# Patient Record
Sex: FEMALE | Race: WHITE | NOT HISPANIC OR LATINO | URBAN - METROPOLITAN AREA
[De-identification: names, ages, dates, MRNs, and addresses within clinical notes are randomized per-mention and may not be internally consistent; named-entity substitution may affect disease eponyms.]

---

## 2018-03-25 ENCOUNTER — EMERGENCY (EMERGENCY)
Facility: HOSPITAL | Age: 73
LOS: 1 days | Discharge: HOME | End: 2018-03-25
Attending: EMERGENCY MEDICINE | Admitting: EMERGENCY MEDICINE
Payer: COMMERCIAL

## 2018-03-25 VITALS
RESPIRATION RATE: 18 BRPM | SYSTOLIC BLOOD PRESSURE: 126 MMHG | HEART RATE: 72 BPM | DIASTOLIC BLOOD PRESSURE: 72 MMHG | OXYGEN SATURATION: 97 %

## 2018-03-25 VITALS
DIASTOLIC BLOOD PRESSURE: 76 MMHG | RESPIRATION RATE: 18 BRPM | HEART RATE: 74 BPM | TEMPERATURE: 98 F | SYSTOLIC BLOOD PRESSURE: 195 MMHG | OXYGEN SATURATION: 96 %

## 2018-03-25 PROCEDURE — 99284 EMERGENCY DEPT VISIT MOD MDM: CPT | Mod: 25

## 2018-03-25 PROCEDURE — 96372 THER/PROPH/DIAG INJ SC/IM: CPT

## 2018-03-25 RX ORDER — DIAZEPAM 5 MG
1 TABLET ORAL
Qty: 15 | Refills: 0 | OUTPATIENT
Start: 2018-03-25 | End: 2018-03-29

## 2018-03-25 RX ORDER — KETOROLAC TROMETHAMINE 30 MG/ML
15 SYRINGE (ML) INJECTION ONCE
Qty: 0 | Refills: 0 | Status: DISCONTINUED | OUTPATIENT
Start: 2018-03-25 | End: 2018-03-25

## 2018-03-25 RX ORDER — OXYCODONE AND ACETAMINOPHEN 5; 325 MG/1; MG/1
1 TABLET ORAL ONCE
Qty: 0 | Refills: 0 | Status: DISCONTINUED | OUTPATIENT
Start: 2018-03-25 | End: 2018-03-25

## 2018-03-25 RX ORDER — KETOROLAC TROMETHAMINE 30 MG/ML
1 SYRINGE (ML) INJECTION
Qty: 20 | Refills: 0 | OUTPATIENT
Start: 2018-03-25 | End: 2018-03-29

## 2018-03-25 RX ORDER — MORPHINE SULFATE 50 MG/1
2 CAPSULE, EXTENDED RELEASE ORAL ONCE
Qty: 0 | Refills: 0 | Status: DISCONTINUED | OUTPATIENT
Start: 2018-03-25 | End: 2018-03-25

## 2018-03-25 RX ADMIN — MORPHINE SULFATE 2 MILLIGRAM(S): 50 CAPSULE, EXTENDED RELEASE ORAL at 02:08

## 2018-03-25 RX ADMIN — OXYCODONE AND ACETAMINOPHEN 1 TABLET(S): 5; 325 TABLET ORAL at 02:08

## 2018-03-25 RX ADMIN — OXYCODONE AND ACETAMINOPHEN 1 TABLET(S): 5; 325 TABLET ORAL at 01:08

## 2018-03-25 RX ADMIN — Medication 15 MILLIGRAM(S): at 02:03

## 2018-03-25 NOTE — ED PROVIDER NOTE - OBJECTIVE STATEMENT
73yo female with h/o ovarian ca currently receiving chemo (1st dose last week), no radiation presenting with muscle spasm of neck. Pt states that she woke up today and could not move her neck and now cannot sleep secondary to the pain. Pt denies fever, chills, injury.

## 2018-03-25 NOTE — ED ADULT NURSE NOTE - CHPI ED SYMPTOMS NEG
no blurred vision/no confusion/no change in level of consciousness/no loss of consciousness/no nausea/no vomiting/no dizziness/no fever/no weakness/no numbness

## 2018-03-25 NOTE — ED ADULT NURSE REASSESSMENT NOTE - NS ED NURSE REASSESS COMMENT FT1
Pt verbalized understanding of discharge instructions. Pt stated, " I am feeling much better" ambulating with steady gait noted.

## 2018-03-25 NOTE — ED ADULT NURSE NOTE - OBJECTIVE STATEMENT
pt presents to ED c/o neck pain. Pt states, " I went to bed and then I woke up with severe neck pain that goes to my head and ears and it feels like it is throbbing" pt denies trauma and fall.

## 2018-03-25 NOTE — ED PROVIDER NOTE - MEDICAL DECISION MAKING DETAILS
pt with severe spasm of parapsinal muscles and trapezius with point tenderness. No fever or risk of infection given that chemo was started recently. will reassess after valium and percocet.

## 2018-03-25 NOTE — ED ADULT NURSE REASSESSMENT NOTE - NS ED NURSE REASSESS COMMENT FT1
Pt states " I am still in a lot of pain in the same areas I mentioned before" ERP made aware. awaiting further orders

## 2018-03-29 DIAGNOSIS — Z88.0 ALLERGY STATUS TO PENICILLIN: ICD-10-CM

## 2018-03-29 DIAGNOSIS — Z88.1 ALLERGY STATUS TO OTHER ANTIBIOTIC AGENTS STATUS: ICD-10-CM

## 2018-03-29 DIAGNOSIS — M62.838 OTHER MUSCLE SPASM: ICD-10-CM

## 2018-03-29 DIAGNOSIS — M54.2 CERVICALGIA: ICD-10-CM

## 2020-03-23 NOTE — ED ADULT TRIAGE NOTE - SOURCE OF INFORMATION
Pt called us last week. Pt is still c/o having sinus congestion, pressure, and headache, her chest feels heavy when she takes a deep breath, she feels as though she has a low-grade fever-but she does not have a thermometer to check for sure-the stores are out of them, her ears feeling clogged, and fatigue. No SOB. Pt has not had any travel, and has not been exposed to anyone with the Covid-19 virus. Symptoms started last week on Thursday. Yesterday she started feeling dizzy, and she is still has it today. The dizziness is slight, but she can feel it. Please advise.    Patient

## 2020-12-11 ENCOUNTER — APPOINTMENT (OUTPATIENT)
Dept: ULTRASOUND IMAGING | Facility: CLINIC | Age: 75
End: 2020-12-11
Payer: COMMERCIAL

## 2020-12-11 ENCOUNTER — APPOINTMENT (OUTPATIENT)
Dept: MAMMOGRAPHY | Facility: CLINIC | Age: 75
End: 2020-12-11
Payer: COMMERCIAL

## 2020-12-11 ENCOUNTER — TRANSCRIPTION ENCOUNTER (OUTPATIENT)
Age: 75
End: 2020-12-11

## 2020-12-11 PROBLEM — Z00.00 ENCOUNTER FOR PREVENTIVE HEALTH EXAMINATION: Status: ACTIVE | Noted: 2020-12-11

## 2020-12-11 PROCEDURE — 77066 DX MAMMO INCL CAD BI: CPT

## 2020-12-11 PROCEDURE — G0279: CPT

## 2020-12-11 PROCEDURE — 76641 ULTRASOUND BREAST COMPLETE: CPT | Mod: RT

## 2022-03-22 NOTE — ED ADULT NURSE NOTE - EENT WDL
Clinic Care Coordination Contact  [facility]/Voicemail    Referral Source: IP Report  Clinical Data: Care Coordinator Outreach  Outreach attempted x 2.  Left message on patient's voicemail with call back information and requested return call.    Plan: Care Coordinator will send care coordination introduction letter with care coordinator contact information and explanation of care coordination services via mail. Care Coordinator will do no further outreaches at this time.     Araceli Freire RN, BSN, PHN  Primary Care / Care Coordinator   [facility]  E-mail [email or web address]   [phone]         Eyes with no visual disturbances.  Ears clean and dry and no hearing difficulties. Nose with pink mucosa and no drainage.  Mouth mucous membranes moist and pink.  No tenderness or swelling to throat or neck.

## 2022-06-08 ENCOUNTER — APPOINTMENT (OUTPATIENT)
Dept: MAMMOGRAPHY | Facility: CLINIC | Age: 77
End: 2022-06-08

## 2022-06-08 ENCOUNTER — APPOINTMENT (OUTPATIENT)
Dept: ULTRASOUND IMAGING | Facility: CLINIC | Age: 77
End: 2022-06-08

## 2022-08-03 ENCOUNTER — APPOINTMENT (OUTPATIENT)
Dept: ULTRASOUND IMAGING | Facility: CLINIC | Age: 77
End: 2022-08-03

## 2022-08-03 ENCOUNTER — APPOINTMENT (OUTPATIENT)
Dept: MAMMOGRAPHY | Facility: CLINIC | Age: 77
End: 2022-08-03

## 2022-08-03 PROCEDURE — 77063 BREAST TOMOSYNTHESIS BI: CPT

## 2022-08-03 PROCEDURE — 76641 ULTRASOUND BREAST COMPLETE: CPT | Mod: LT

## 2022-08-03 PROCEDURE — 77067 SCR MAMMO BI INCL CAD: CPT

## 2023-01-11 ENCOUNTER — NON-APPOINTMENT (OUTPATIENT)
Age: 78
End: 2023-01-11

## 2023-05-05 ENCOUNTER — NON-APPOINTMENT (OUTPATIENT)
Age: 78
End: 2023-05-05

## 2023-05-18 ENCOUNTER — NON-APPOINTMENT (OUTPATIENT)
Age: 78
End: 2023-05-18

## 2023-09-07 NOTE — ED ADULT NURSE NOTE - CADM POA URETHRAL CATHETER
This was a shared visit with the BRIELLE. I reviewed and verified the documentation and independently performed the documented: No

## 2023-11-20 ENCOUNTER — APPOINTMENT (OUTPATIENT)
Dept: ULTRASOUND IMAGING | Facility: CLINIC | Age: 78
End: 2023-11-20

## 2023-11-20 ENCOUNTER — APPOINTMENT (OUTPATIENT)
Dept: MAMMOGRAPHY | Facility: CLINIC | Age: 78
End: 2023-11-20
Payer: COMMERCIAL

## 2023-11-20 PROCEDURE — 76641 ULTRASOUND BREAST COMPLETE: CPT | Mod: LT

## 2023-11-20 PROCEDURE — 77063 BREAST TOMOSYNTHESIS BI: CPT

## 2023-11-20 PROCEDURE — 77067 SCR MAMMO BI INCL CAD: CPT

## 2023-11-22 ENCOUNTER — APPOINTMENT (OUTPATIENT)
Dept: ULTRASOUND IMAGING | Facility: CLINIC | Age: 78
End: 2023-11-22
Payer: COMMERCIAL

## 2023-11-22 PROCEDURE — 76642 ULTRASOUND BREAST LIMITED: CPT | Mod: LT

## 2023-11-30 ENCOUNTER — OUTPATIENT (OUTPATIENT)
Dept: OUTPATIENT SERVICES | Facility: HOSPITAL | Age: 78
LOS: 1 days | End: 2023-11-30

## 2023-11-30 ENCOUNTER — APPOINTMENT (OUTPATIENT)
Dept: ULTRASOUND IMAGING | Facility: CLINIC | Age: 78
End: 2023-11-30
Payer: COMMERCIAL

## 2023-11-30 PROCEDURE — 88305 TISSUE EXAM BY PATHOLOGIST: CPT | Mod: 26

## 2023-11-30 PROCEDURE — 19084 BX BREAST ADD LESION US IMAG: CPT | Mod: RT

## 2023-11-30 PROCEDURE — 19083 BX BREAST 1ST LESION US IMAG: CPT | Mod: LT

## 2023-11-30 PROCEDURE — 77066 DX MAMMO INCL CAD BI: CPT | Mod: 26

## 2024-02-08 ENCOUNTER — APPOINTMENT (OUTPATIENT)
Dept: CARDIOLOGY | Facility: CLINIC | Age: 79
End: 2024-02-08
Payer: COMMERCIAL

## 2024-02-08 VITALS
WEIGHT: 119 LBS | BODY MASS INDEX: 19.13 KG/M2 | OXYGEN SATURATION: 97 % | DIASTOLIC BLOOD PRESSURE: 78 MMHG | HEART RATE: 90 BPM | HEIGHT: 66 IN | SYSTOLIC BLOOD PRESSURE: 124 MMHG

## 2024-02-08 DIAGNOSIS — R03.0 ELEVATED BLOOD-PRESSURE READING, W/OUT DIAGNOSIS OF HYPERTENSION: ICD-10-CM

## 2024-02-08 DIAGNOSIS — R94.31 ABNORMAL ELECTROCARDIOGRAM [ECG] [EKG]: ICD-10-CM

## 2024-02-08 DIAGNOSIS — I65.29 OCCLUSION AND STENOSIS OF UNSPECIFIED CAROTID ARTERY: ICD-10-CM

## 2024-02-08 PROCEDURE — 93000 ELECTROCARDIOGRAM COMPLETE: CPT

## 2024-02-08 PROCEDURE — 99205 OFFICE O/P NEW HI 60 MIN: CPT

## 2024-02-08 RX ORDER — ELECTROLYTES/DEXTROSE
SOLUTION, ORAL ORAL
Refills: 0 | Status: ACTIVE | COMMUNITY

## 2024-02-08 RX ORDER — VITAMIN K2 90 MCG
1000 CAPSULE ORAL
Refills: 0 | Status: ACTIVE | COMMUNITY

## 2024-02-08 RX ORDER — ERGOCALCIFEROL 1.25 MG/1
CAPSULE ORAL
Refills: 0 | Status: ACTIVE | COMMUNITY

## 2024-02-08 RX ORDER — IPRATROPIUM BROMIDE 21 UG/1
0.03 SPRAY NASAL
Refills: 0 | Status: ACTIVE | COMMUNITY

## 2024-02-09 LAB
ALBUMIN SERPL ELPH-MCNC: 4.9 G/DL
ALP BLD-CCNC: 84 U/L
ALT SERPL-CCNC: 15 U/L
ANION GAP SERPL CALC-SCNC: 18 MMOL/L
AST SERPL-CCNC: 27 U/L
BILIRUB SERPL-MCNC: 0.8 MG/DL
BUN SERPL-MCNC: 31 MG/DL
CALCIUM SERPL-MCNC: 9.7 MG/DL
CHLORIDE SERPL-SCNC: 103 MMOL/L
CHOLEST SERPL-MCNC: 164 MG/DL
CO2 SERPL-SCNC: 19 MMOL/L
CREAT SERPL-MCNC: 0.84 MG/DL
EGFR: 72 ML/MIN/1.73M2
ESTIMATED AVERAGE GLUCOSE: 94 MG/DL
GLUCOSE SERPL-MCNC: 104 MG/DL
HBA1C MFR BLD HPLC: 4.9 %
HDLC SERPL-MCNC: 64 MG/DL
LDLC SERPL CALC-MCNC: 74 MG/DL
NONHDLC SERPL-MCNC: 100 MG/DL
NT-PROBNP SERPL-MCNC: 710 PG/ML
POTASSIUM SERPL-SCNC: 4.3 MMOL/L
PROT SERPL-MCNC: 6.6 G/DL
SODIUM SERPL-SCNC: 139 MMOL/L
TRIGL SERPL-MCNC: 149 MG/DL

## 2024-02-14 ENCOUNTER — APPOINTMENT (OUTPATIENT)
Dept: CARDIOLOGY | Facility: CLINIC | Age: 79
End: 2024-02-14
Payer: COMMERCIAL

## 2024-02-14 VITALS
HEIGHT: 66 IN | WEIGHT: 119 LBS | BODY MASS INDEX: 19.13 KG/M2 | DIASTOLIC BLOOD PRESSURE: 83 MMHG | SYSTOLIC BLOOD PRESSURE: 155 MMHG | OXYGEN SATURATION: 97 % | HEART RATE: 82 BPM

## 2024-02-14 DIAGNOSIS — I87.2 VENOUS INSUFFICIENCY (CHRONIC) (PERIPHERAL): ICD-10-CM

## 2024-02-14 PROCEDURE — 99205 OFFICE O/P NEW HI 60 MIN: CPT

## 2024-02-14 NOTE — PHYSICAL EXAM
[Normal Conjunctiva] : the conjunctiva exhibited no abnormalities [Normal Oral Mucosa] : normal oral mucosa [Heart Rate And Rhythm] : heart rate and rhythm were normal [Heart Sounds] : normal S1 and S2 [Arterial Pulses Normal] : the arterial pulses were normal [Edema] : no peripheral edema present [Abdomen Soft] : soft [Abdomen Tenderness] : non-tender [Abnormal Walk] : normal gait [] : no ischemic changes [FreeTextEntry1] : reticular pattern bilateral lower extremities  [No Venous Stasis] : no venous stasis [No Skin Ulcers] : no skin ulcer [Oriented To Time, Place, And Person] : oriented to person, place, and time [Impaired Insight] : insight and judgment were intact [Affect] : the affect was normal

## 2024-02-14 NOTE — REVIEW OF SYSTEMS
[Palpitations] : palpitations [Joint Pain] : joint pain [Joint Swelling] : joint swelling [Joint Stiffness] : joint stiffness [Change In Color Of Skin] : change in skin color [Numbness (Hypoesthesia)] : numbness [Easy Bruising] : a tendency for easy bruising [Negative] : Heme/Lymph [de-identified] : in cold weather get reticular pattern on legs

## 2024-02-14 NOTE — REASON FOR VISIT
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 Referred for vascular medicine evaluation.   Prior venous testing before COVID. No prior procedures; attempted sclerotherapy on mid calf spider veins a long time ago that was really painful. No prior DVT. Her legs are not swollen. She mostly complains of some tenderness over areas of spider veins or capillaries. She wears compression stockings. She swims everyday.  Varicose veins are most prominent behind her knees.   Working on her BP; related to avastin. Seeing nephrology tomorrow. Has carotid stenosis and getting a repeat carotid arterial duplex.

## 2024-02-14 NOTE — ASSESSMENT
[FreeTextEntry1] : #Varicose veins: no recent resting for venous insufficiency. No prior DVT. Bothersome varicose veins. Wears compression stockings. Prior sclerotherapy for spider veins.   -venous duplex with reflux studies -continue conservative therapy with compression and elevation  -continue to exercise with swimming -HTN management per nephrology and her cardiology oncologist -will call her with the results of the venous duplex and make a follow-up based on findings and if it changes management plan

## 2024-02-15 ENCOUNTER — NON-APPOINTMENT (OUTPATIENT)
Age: 79
End: 2024-02-15

## 2024-02-15 PROBLEM — R03.0 ELEVATED BLOOD PRESSURE READING: Status: ACTIVE | Noted: 2024-02-15

## 2024-02-15 RX ORDER — PRASTERONE (DHEA) 50 MG
CAPSULE ORAL
Refills: 0 | Status: ACTIVE | COMMUNITY

## 2024-02-15 NOTE — HISTORY OF PRESENT ILLNESS
[FreeTextEntry1] : Jeanine was diagnosed with stage IIIC fallopian tube carcinoma in . In 2018 there was evidence of recurrence. She has received several lines of therapy as noted above and is currently on maintenance bevacizumab with no evidence of disease on imaging scans but slightly increasing CA-125.  Over the past 6 months, noted to have proteinuria and slightly increased blood pressure. She is asymptomatic. She has not noticed any corresponding changes, but the most recent treatment cycle was held pending evaluation by a Nephrologist. She notes only minimally abnormal CA-125 levels throughout her cancer treatment. A PET scan was ordered by her oncologist given the discordance between tumor markers and imaging, but is pending insurance approval.  She has followed with cardiologist for hypercholesterolemia, aortic stenosis, and carotid atherosclerotic plaque. She reports a history of intermittent palpitations, which feel like a fullness or skipped beat, which she had for many years -- prior to cancer treatment. These do not bother her much and are not recently changed.  She remains active, swimming 30-60 minutes a few times per week and walking. She denies chest pain, shortness of breath, or dyspnea on exertion.  She is a former smoker. She lives in [place] and [facility] with her . 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  Cardiovascular Summary: ---------------------------------------------- EC2024: sinus rhythm with LVH and repolarization abnormalities ---------------------------------------------- Echo: ---------------------------------------------- Stress: ---------------------------------------------- CT/MRI ---------------------------------------------- Remote/ambulatory rhythm monitoring:

## 2024-02-15 NOTE — REASON FOR VISIT
[FreeTextEntry3] : Dr. Barrie Youssef/Dr. Zonia Sandoval [FreeTextEntry1] : ------------------------------------------------------------------------ ANTOINE KAPLAN is a 78 year old woman with a history of hypercholesterolemia and recurrent fallopian tube carcinoma seen for hypertension and valvular heart disease.  Prior Cancer Treatments: ------------------------------------------------------------------------ Chemo/targeted therapy: 10/30/2013-4/3/2014: carboplatin/Taxol x8 8/29/2014-1/14/2015: cyclophosphamide 3/22/2018-10/15/2018: carboplatin/gemcitabine 1/24/2018-4/1/2019: Doxil x 4 5/23/2019-1/2020: Taxol/carboplatin --> Taxol maintenance 2/27/2020-12/1/2021: olaparib  1/27/2022-6/22/2022: Taxol + bevacizumab 9/28/2022: bevacizumab 15 mg/kg q3 weeks (maintenance) ------------------------------------------------------------------------ Surgery: 10/7/2013: ex-lap, DHIRAJ-BSO 6/2/2014: ex-lap ------------------------------------------------------------------------ Radiation: 6/2014: vaginal radiation

## 2024-02-15 NOTE — REVIEW OF SYSTEMS
[Palpitations] : palpitations [Joint Pain] : joint pain [Joint Stiffness] : joint stiffness [Numbness (Hypoesthesia)] : numbness [Negative] : Heme/Lymph [SOB] : no shortness of breath [Syncope] : no syncope

## 2024-02-15 NOTE — ASSESSMENT
[FreeTextEntry1] : ---------------------------- # Proteinuria and elevated blood pressure - likely related to VEGF inhibitor. More recently increased proteinuria and BP more elevated at times. No edema. Normal renal function. Also increased fasting plasma glucose readings. Proteinuria is increasing but not nephrotic range and < 2 gram/day on 24 hour assessments. - would start ARB (e.g. losartan)  - check A1c - to see Nephrologist at [facility] on 2/15 (Dr. Nirav Argueta) - and will f/u after to discuss  At present, the proteinuria and hypertension should not preclude further bevacizumab if she is responding, would continue with monitoring of renal function and proteinuria. 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 A PET scan was recommended by Dr. Sandoval, but not yet approved by her insurance. Given discordance and potential VEGF toxicity, I agree a PET scan would be helpful. - Will check pro-BNP today to see if fluid retention from cardiomyopathy could contribute to CA-125 increase  # Aortic stenosis - moderate on examination - to check echocardiogram - will work with Jeanine to obtain prior cardiology records  # Atherosclerosis and carotid stenosis: asymptomatic at present - continue rosuvastatin - check lipid panel - carotid duplex ordered - continue aspirin 81 mg daily for now  # Varicose Veins: - referred to vascular medicine for eval   Follow up with me in 2 months or after upcoming Medical Oncology follow up
Basal cell carcinoma  and squamous cell carcinoma - removed  Cataract  bilateral  DDD (degenerative disc disease), lumbar    Goiter    Hearing loss  left - mild  Hypertension    Lumbar stenosis    Obesity    Psoriatic arthritis    Renal cell carcinoma  with mets.  - S/P R nephrectomy in 2007 and, splenectomy and pancreatic resection 6/2019  RSD (reflex sympathetic dystrophy)    Thyroid nodule

## 2024-02-15 NOTE — PHYSICAL EXAM
[Normal] : well developed, well nourished, no acute distress [Normal Conjunctiva] : normal conjunctiva [No Xanthelasma] : no xanthelasma [Normal Venous Pressure] : normal venous pressure [No Carotid Bruit] : no carotid bruit [Normal S1, S2] : normal S1, S2 [No Rub] : no rub [No Gallop] : no gallop [Murmur] : murmur [Clear Lung Fields] : clear lung fields [Good Air Entry] : good air entry [No Respiratory Distress] : no respiratory distress  [Soft] : abdomen soft [Non Tender] : non-tender [Normal Gait] : normal gait [Gait - Sufficient for Exercise Testing] : gait - sufficient for exercise testing [No Edema] : no edema [No Cyanosis] : no cyanosis [No Clubbing] : no clubbing [No Varicosities] : no varicosities [Normal DP B/L] : normal DP B/L [No Rash] : no rash [Moves all extremities] : moves all extremities [No Focal Deficits] : no focal deficits [Normal Speech] : normal speech [Alert and Oriented] : alert and oriented [Normal memory] : normal memory [de-identified] : systolic ejection murmur

## 2024-02-20 ENCOUNTER — APPOINTMENT (OUTPATIENT)
Dept: HEART AND VASCULAR | Facility: CLINIC | Age: 79
End: 2024-02-20
Payer: COMMERCIAL

## 2024-02-20 PROCEDURE — 93306 TTE W/DOPPLER COMPLETE: CPT

## 2024-02-21 RX ORDER — LOSARTAN POTASSIUM 25 MG/1
25 TABLET, FILM COATED ORAL
Qty: 30 | Refills: 3 | Status: ACTIVE | COMMUNITY
Start: 2024-02-21 | End: 1900-01-01

## 2024-03-03 ENCOUNTER — NON-APPOINTMENT (OUTPATIENT)
Age: 79
End: 2024-03-03

## 2024-03-07 ENCOUNTER — APPOINTMENT (OUTPATIENT)
Dept: HEART AND VASCULAR | Facility: CLINIC | Age: 79
End: 2024-03-07
Payer: COMMERCIAL

## 2024-03-07 PROCEDURE — 93880 EXTRACRANIAL BILAT STUDY: CPT

## 2024-03-19 ENCOUNTER — NON-APPOINTMENT (OUTPATIENT)
Age: 79
End: 2024-03-19

## 2024-03-21 RX ORDER — ROSUVASTATIN CALCIUM 20 MG/1
20 TABLET, FILM COATED ORAL
Qty: 90 | Refills: 3 | Status: ACTIVE | COMMUNITY
Start: 1900-01-01 | End: 1900-01-01

## 2024-03-22 ENCOUNTER — APPOINTMENT (OUTPATIENT)
Dept: HEART AND VASCULAR | Facility: CLINIC | Age: 79
End: 2024-03-22
Payer: COMMERCIAL

## 2024-03-22 PROCEDURE — 93970 EXTREMITY STUDY: CPT

## 2024-03-27 ENCOUNTER — NON-APPOINTMENT (OUTPATIENT)
Age: 79
End: 2024-03-27

## 2024-04-25 ENCOUNTER — APPOINTMENT (OUTPATIENT)
Dept: CARDIOLOGY | Facility: CLINIC | Age: 79
End: 2024-04-25
Payer: COMMERCIAL

## 2024-04-25 ENCOUNTER — NON-APPOINTMENT (OUTPATIENT)
Age: 79
End: 2024-04-25

## 2024-04-25 VITALS
BODY MASS INDEX: 18.96 KG/M2 | WEIGHT: 118 LBS | HEART RATE: 96 BPM | HEIGHT: 66 IN | OXYGEN SATURATION: 99 % | SYSTOLIC BLOOD PRESSURE: 132 MMHG | TEMPERATURE: 97.5 F | DIASTOLIC BLOOD PRESSURE: 79 MMHG

## 2024-04-25 DIAGNOSIS — I35.0 NONRHEUMATIC AORTIC (VALVE) STENOSIS: ICD-10-CM

## 2024-04-25 DIAGNOSIS — R80.9 PROTEINURIA, UNSPECIFIED: ICD-10-CM

## 2024-04-25 DIAGNOSIS — C57.00 MALIGNANT NEOPLASM OF UNSPECIFIED FALLOPIAN TUBE: ICD-10-CM

## 2024-04-25 DIAGNOSIS — I10 ESSENTIAL (PRIMARY) HYPERTENSION: ICD-10-CM

## 2024-04-25 PROCEDURE — 93000 ELECTROCARDIOGRAM COMPLETE: CPT

## 2024-04-25 PROCEDURE — G2211 COMPLEX E/M VISIT ADD ON: CPT | Mod: NC,1L

## 2024-04-25 PROCEDURE — 99215 OFFICE O/P EST HI 40 MIN: CPT

## 2024-04-25 RX ORDER — BEVACIZUMAB 400 MG/16ML
INJECTION, SOLUTION INTRAVENOUS
Refills: 0 | Status: COMPLETED | COMMUNITY
End: 2024-04-25

## 2024-04-25 NOTE — HISTORY OF PRESENT ILLNESS
[FreeTextEntry1] : Interval History: She has been taking losartan and the blood pressures at home have been mostly in the 130s systolic. She has not had recurrent assessment of proteinuria. After discussion with Dr. Sandoval and Dr. Youssef, she began treatment with Elahere (mirvetuximab) one week ago on . She is using topical eye protection. She reports no changes in vision. Otherwise, she was noted to have a tick embedded under the skin near her bra line. This was removed. She never noticed a rash or other associated symptoms of a tick bite. She is completing prophylactic doxycycline. She notes some increased fatigue, but still walking a mild to exercise at the gym each day and continues to swim a few times a week. She notes the most recent CA-125 was lower (70) - prior to starting the new agent.  History: Jeanine was diagnosed with stage IIIC fallopian tube carcinoma in . In 2018 there was evidence of recurrence. She has received several lines of therapy as noted above and is currently on maintenance bevacizumab with no evidence of disease on imaging scans but slightly increasing CA-125.  Over the past 6 months, noted to have proteinuria and slightly increased blood pressure. She is asymptomatic. She has not noticed any corresponding changes, but the most recent treatment cycle was held pending evaluation by a Nephrologist. She notes only minimally abnormal CA-125 levels throughout her cancer treatment. A PET scan was ordered by her oncologist given the discordance between tumor markers and imaging, but is pending insurance approval.  She has followed with cardiologist for hypercholesterolemia, aortic stenosis, and carotid atherosclerotic plaque. She reports a history of intermittent palpitations, which feel like a fullness or skipped beat, which she had for many years -- prior to cancer treatment. These do not bother her much and are not recently changed.  She remains active, swimming 30-60 minutes a few times per week and walking. She denies chest pain, shortness of breath, or dyspnea on exertion.  She is a former smoker. She lives in [place] and [facility] with her . 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  Cardiovascular Summary: ---------------------------------------------- EC2024: sinus rhythm with LVH and abnormal repol (no sig change) 2024: sinus rhythm with LVH and repolarization abnormalities ---------------------------------------------- Echo: 2024: EF 75 %, moderate AS (BERNADETTE 1.2), mean gradient 32,  ---------------------------------------------- Carotid: 3/7/2024: R pICA 20-50%, L pICA 20-50 % ----------------------------------------------

## 2024-04-25 NOTE — REVIEW OF SYSTEMS
[Palpitations] : palpitations [Joint Pain] : joint pain [Joint Stiffness] : joint stiffness [Numbness (Hypoesthesia)] : numbness [Negative] : Heme/Lymph [Feeling Fatigued] : feeling fatigued [Blurry Vision] : no blurred vision [Eye Pain] : no eye pain [SOB] : shortness of breath [Syncope] : no syncope

## 2024-04-25 NOTE — REASON FOR VISIT
[FreeTextEntry3] : Dr. Barrie Youssef/Dr. Zonia Sandoval [FreeTextEntry1] : ------------------------------------------------------------------------ ANTOINE KAPLAN is a 78 year old woman with a history of hypercholesterolemia and recurrent fallopian tube carcinoma seen for hypertension and valvular heart disease.  Prior Cancer Treatments: ------------------------------------------------------------------------ Chemo/targeted therapy: 10/30/2013-4/3/2014: carboplatin/Taxol x8 8/29/2014-1/14/2015: cyclophosphamide 3/22/2018-10/15/2018: carboplatin/gemcitabine 1/24/2018-4/1/2019: Doxil x 4 5/23/2019-1/2020: Taxol/carboplatin --> Taxol maintenance 2/27/2020-12/1/2021: olaparib  1/27/2022-6/22/2022: Taxol + bevacizumab 9/28/2022-January 2024: bevacizumab 15 mg/kg q3 weeks (maintenance) 4/17/2024: Elahere ------------------------------------------------------------------------ Surgery: 10/7/2013: DHIRAJ ayala-BSO 6/2/2014: jamie ------------------------------------------------------------------------ Radiation: 6/2014: vaginal radiation

## 2024-04-25 NOTE — ASSESSMENT
[FreeTextEntry1] : ---------------------------- # Proteinuria and elevated blood pressure - likely related to VEGF inhibitor. No edema. Normal renal function. Also increased fasting plasma glucose readings. Proteinuria is increasing but not nephrotic range and < 2 gram/day on 24 hour assessments. At present, the proteinuria and hypertension should not preclude further bevacizumab. However, for other reasons (suspected resistance), the treatment was changed to mirvetuximab. - to continue losartan for now - repeat urine protein at next opportunity - monitor BP, discussed that it may normalize off bevacizumab  # Aortic stenosis - moderate  # Atherosclerosis and carotid stenosis: asymptomatic at present - continue rosuvastatin - continue aspirin 81 mg daily for now  # Varicose Veins: with venous insufficiency - for follow up with Vascular  To continue follow up of response to Elahere. Fatigue may be related to tick bite, as it seems to have preceded this agent. In addition, Hgb was slightly lower than baseline.   Follow up with me in

## 2024-04-25 NOTE — PHYSICAL EXAM
[Normal] : well developed, well nourished, no acute distress [Normal Conjunctiva] : normal conjunctiva [No Xanthelasma] : no xanthelasma [Normal Venous Pressure] : normal venous pressure [No Carotid Bruit] : no carotid bruit [Normal S1, S2] : normal S1, S2 [No Rub] : no rub [No Gallop] : no gallop [Murmur] : murmur [Clear Lung Fields] : clear lung fields [Good Air Entry] : good air entry [No Respiratory Distress] : no respiratory distress  [Soft] : abdomen soft [Non Tender] : non-tender [Normal Gait] : normal gait [Gait - Sufficient for Exercise Testing] : gait - sufficient for exercise testing [No Edema] : no edema [No Cyanosis] : no cyanosis [No Clubbing] : no clubbing [No Varicosities] : no varicosities [Normal DP B/L] : normal DP B/L [No Rash] : no rash [Moves all extremities] : moves all extremities [No Focal Deficits] : no focal deficits [Normal Speech] : normal speech [Alert and Oriented] : alert and oriented [Normal memory] : normal memory [Venous varicosities] : venous varicosities [de-identified] : systolic ejection murmur

## 2024-04-25 NOTE — DISCUSSION/SUMMARY
Condition:: FBSE
Please Describe Your Condition:: is an established patient who is being seen for a chief complaint of FBSE. \\n- Annual FBSE, last seen 5/27/22\\n- H/O AKs and Dysplastic Nevi \\n- Complaints of irritated skin tags\\n- Bump on posterior scalp, No associated pain
[EKG obtained to assist in diagnosis and management of assessed problem(s)] : EKG obtained to assist in diagnosis and management of assessed problem(s)

## 2024-04-27 ENCOUNTER — NON-APPOINTMENT (OUTPATIENT)
Age: 79
End: 2024-04-27
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
## 2024-05-28 DIAGNOSIS — S30.861A INSECT BITE (NONVENOMOUS) OF ABDOMINAL WALL, INITIAL ENCOUNTER: ICD-10-CM

## 2024-05-31 ENCOUNTER — LABORATORY RESULT (OUTPATIENT)
Age: 79
End: 2024-05-31

## 2024-10-10 ENCOUNTER — NON-APPOINTMENT (OUTPATIENT)
Age: 79
End: 2024-10-10

## 2024-10-10 ENCOUNTER — APPOINTMENT (OUTPATIENT)
Dept: CARDIOLOGY | Facility: CLINIC | Age: 79
End: 2024-10-10
Payer: COMMERCIAL

## 2024-10-10 VITALS
SYSTOLIC BLOOD PRESSURE: 146 MMHG | BODY MASS INDEX: 18.16 KG/M2 | HEIGHT: 66 IN | WEIGHT: 113 LBS | HEART RATE: 84 BPM | TEMPERATURE: 97.3 F | OXYGEN SATURATION: 97 % | DIASTOLIC BLOOD PRESSURE: 67 MMHG

## 2024-10-10 DIAGNOSIS — R06.02 SHORTNESS OF BREATH: ICD-10-CM

## 2024-10-10 DIAGNOSIS — R80.9 PROTEINURIA, UNSPECIFIED: ICD-10-CM

## 2024-10-10 DIAGNOSIS — C57.00 MALIGNANT NEOPLASM OF UNSPECIFIED FALLOPIAN TUBE: ICD-10-CM

## 2024-10-10 DIAGNOSIS — S30.861A INSECT BITE (NONVENOMOUS) OF ABDOMINAL WALL, INITIAL ENCOUNTER: ICD-10-CM
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
## 2024-10-10 DIAGNOSIS — I65.29 OCCLUSION AND STENOSIS OF UNSPECIFIED CAROTID ARTERY: ICD-10-CM

## 2024-10-10 DIAGNOSIS — I10 ESSENTIAL (PRIMARY) HYPERTENSION: ICD-10-CM

## 2024-10-10 DIAGNOSIS — I35.0 NONRHEUMATIC AORTIC (VALVE) STENOSIS: ICD-10-CM

## 2024-10-10 PROCEDURE — G2211 COMPLEX E/M VISIT ADD ON: CPT | Mod: NC

## 2024-10-10 PROCEDURE — 99215 OFFICE O/P EST HI 40 MIN: CPT

## 2024-10-10 PROCEDURE — 93000 ELECTROCARDIOGRAM COMPLETE: CPT

## 2024-10-11 LAB
ALBUMIN SERPL ELPH-MCNC: 4.4 G/DL
ALP BLD-CCNC: 109 U/L
ALT SERPL-CCNC: 21 U/L
ANION GAP SERPL CALC-SCNC: 18 MMOL/L
AST SERPL-CCNC: 45 U/L
BILIRUB SERPL-MCNC: 0.7 MG/DL
BUN SERPL-MCNC: 14 MG/DL
CALCIUM SERPL-MCNC: 9.2 MG/DL
CHLORIDE SERPL-SCNC: 103 MMOL/L
CO2 SERPL-SCNC: 19 MMOL/L
CREAT SERPL-MCNC: 0.79 MG/DL
CREAT SPEC-SCNC: 110 MG/DL
CREAT/PROT UR: 0.2 RATIO
EGFR: 77 ML/MIN/1.73M2
GLUCOSE SERPL-MCNC: 83 MG/DL
NT-PROBNP SERPL-MCNC: 1518 PG/ML
POTASSIUM SERPL-SCNC: 4 MMOL/L
PROT SERPL-MCNC: 6.6 G/DL
PROT UR-MCNC: 25 MG/DL
PROT UR-MCNC: 25 MG/DL
SODIUM SERPL-SCNC: 141 MMOL/L

## 2024-11-21 ENCOUNTER — APPOINTMENT (OUTPATIENT)
Dept: MAMMOGRAPHY | Facility: CLINIC | Age: 79
End: 2024-11-21
Payer: COMMERCIAL

## 2024-11-21 ENCOUNTER — APPOINTMENT (OUTPATIENT)
Dept: ULTRASOUND IMAGING | Facility: CLINIC | Age: 79
End: 2024-11-21

## 2024-11-21 PROCEDURE — 77063 BREAST TOMOSYNTHESIS BI: CPT

## 2024-11-21 PROCEDURE — 76641 ULTRASOUND BREAST COMPLETE: CPT | Mod: 50

## 2024-11-21 PROCEDURE — 77067 SCR MAMMO BI INCL CAD: CPT

## 2024-12-17 ENCOUNTER — APPOINTMENT (OUTPATIENT)
Dept: HEART AND VASCULAR | Facility: CLINIC | Age: 79
End: 2024-12-17
Payer: COMMERCIAL

## 2024-12-17 PROCEDURE — 93351 STRESS TTE COMPLETE: CPT

## 2025-01-06 ENCOUNTER — RX RENEWAL (OUTPATIENT)
Age: 80
End: 2025-01-06

## 2025-02-20 ENCOUNTER — RESULT REVIEW (OUTPATIENT)
Age: 80
End: 2025-02-20

## 2025-02-20 ENCOUNTER — OUTPATIENT (OUTPATIENT)
Dept: OUTPATIENT SERVICES | Facility: HOSPITAL | Age: 80
LOS: 1 days | End: 2025-02-20
Payer: COMMERCIAL

## 2025-02-20 DIAGNOSIS — Z01.818 ENCOUNTER FOR OTHER PREPROCEDURAL EXAMINATION: ICD-10-CM

## 2025-02-20 DIAGNOSIS — I35.0 NONRHEUMATIC AORTIC (VALVE) STENOSIS: ICD-10-CM

## 2025-02-20 LAB
ALBUMIN SERPL ELPH-MCNC: 4.4 G/DL — SIGNIFICANT CHANGE UP (ref 3.3–5)
ALP SERPL-CCNC: 118 U/L — SIGNIFICANT CHANGE UP (ref 40–120)
ALT FLD-CCNC: 14 U/L — SIGNIFICANT CHANGE UP (ref 10–45)
ANION GAP SERPL CALC-SCNC: 11 MMOL/L — SIGNIFICANT CHANGE UP (ref 5–17)
APTT BLD: 32.4 SEC — SIGNIFICANT CHANGE UP (ref 24.5–35.6)
AST SERPL-CCNC: 29 U/L — SIGNIFICANT CHANGE UP (ref 10–40)
BASOPHILS # BLD AUTO: 0.05 K/UL — SIGNIFICANT CHANGE UP (ref 0–0.2)
BASOPHILS NFR BLD AUTO: 1.1 % — SIGNIFICANT CHANGE UP (ref 0–2)
BILIRUB SERPL-MCNC: 0.7 MG/DL — SIGNIFICANT CHANGE UP (ref 0.2–1.2)
BLD GP AB SCN SERPL QL: POSITIVE — SIGNIFICANT CHANGE UP
BLD GP AB SCN SERPL QL: POSITIVE — SIGNIFICANT CHANGE UP
BUN SERPL-MCNC: 18 MG/DL — SIGNIFICANT CHANGE UP (ref 7–23)
CALCIUM SERPL-MCNC: 9.6 MG/DL — SIGNIFICANT CHANGE UP (ref 8.4–10.5)
CHLORIDE SERPL-SCNC: 100 MMOL/L — SIGNIFICANT CHANGE UP (ref 96–108)
CO2 SERPL-SCNC: 27 MMOL/L — SIGNIFICANT CHANGE UP (ref 22–31)
CREAT SERPL-MCNC: 0.75 MG/DL — SIGNIFICANT CHANGE UP (ref 0.5–1.3)
EGFR: 81 ML/MIN/1.73M2 — SIGNIFICANT CHANGE UP
EOSINOPHIL # BLD AUTO: 0.18 K/UL — SIGNIFICANT CHANGE UP (ref 0–0.5)
EOSINOPHIL NFR BLD AUTO: 4 % — SIGNIFICANT CHANGE UP (ref 0–6)
GLUCOSE SERPL-MCNC: 90 MG/DL — SIGNIFICANT CHANGE UP (ref 70–99)
HCT VFR BLD CALC: 37.2 % — SIGNIFICANT CHANGE UP (ref 34.5–45)
HGB BLD-MCNC: 12.6 G/DL — SIGNIFICANT CHANGE UP (ref 11.5–15.5)
IMM GRANULOCYTES NFR BLD AUTO: 0.2 % — SIGNIFICANT CHANGE UP (ref 0–0.9)
INR BLD: 0.97 — SIGNIFICANT CHANGE UP (ref 0.85–1.16)
LYMPHOCYTES # BLD AUTO: 0.85 K/UL — LOW (ref 1–3.3)
LYMPHOCYTES # BLD AUTO: 18.7 % — SIGNIFICANT CHANGE UP (ref 13–44)
MCHC RBC-ENTMCNC: 32.2 PG — SIGNIFICANT CHANGE UP (ref 27–34)
MCHC RBC-ENTMCNC: 33.9 G/DL — SIGNIFICANT CHANGE UP (ref 32–36)
MCV RBC AUTO: 95.1 FL — SIGNIFICANT CHANGE UP (ref 80–100)
MONOCYTES # BLD AUTO: 0.49 K/UL — SIGNIFICANT CHANGE UP (ref 0–0.9)
MONOCYTES NFR BLD AUTO: 10.8 % — SIGNIFICANT CHANGE UP (ref 2–14)
NEUTROPHILS # BLD AUTO: 2.96 K/UL — SIGNIFICANT CHANGE UP (ref 1.8–7.4)
NEUTROPHILS NFR BLD AUTO: 65.2 % — SIGNIFICANT CHANGE UP (ref 43–77)
NRBC BLD AUTO-RTO: 0 /100 WBCS — SIGNIFICANT CHANGE UP (ref 0–0)
NT-PROBNP SERPL-SCNC: 797 PG/ML — HIGH (ref 0–300)
PLATELET # BLD AUTO: 177 K/UL — SIGNIFICANT CHANGE UP (ref 150–400)
POTASSIUM SERPL-MCNC: 4.2 MMOL/L — SIGNIFICANT CHANGE UP (ref 3.5–5.3)
POTASSIUM SERPL-SCNC: 4.2 MMOL/L — SIGNIFICANT CHANGE UP (ref 3.5–5.3)
PROT SERPL-MCNC: 7.1 G/DL — SIGNIFICANT CHANGE UP (ref 6–8.3)
PROTHROM AB SERPL-ACNC: 11.3 SEC — SIGNIFICANT CHANGE UP (ref 9.9–13.4)
RBC # BLD: 3.91 M/UL — SIGNIFICANT CHANGE UP (ref 3.8–5.2)
RBC # FLD: 13.2 % — SIGNIFICANT CHANGE UP (ref 10.3–14.5)
RH IG SCN BLD-IMP: POSITIVE — SIGNIFICANT CHANGE UP
RH IG SCN BLD-IMP: POSITIVE — SIGNIFICANT CHANGE UP
SODIUM SERPL-SCNC: 138 MMOL/L — SIGNIFICANT CHANGE UP (ref 135–145)
WBC # BLD: 4.54 K/UL — SIGNIFICANT CHANGE UP (ref 3.8–10.5)
WBC # FLD AUTO: 4.54 K/UL — SIGNIFICANT CHANGE UP (ref 3.8–10.5)

## 2025-02-20 PROCEDURE — 86900 BLOOD TYPING SEROLOGIC ABO: CPT

## 2025-02-20 PROCEDURE — 86850 RBC ANTIBODY SCREEN: CPT

## 2025-02-20 PROCEDURE — 85730 THROMBOPLASTIN TIME PARTIAL: CPT

## 2025-02-20 PROCEDURE — 86901 BLOOD TYPING SEROLOGIC RH(D): CPT

## 2025-02-20 PROCEDURE — 86870 RBC ANTIBODY IDENTIFICATION: CPT

## 2025-02-20 PROCEDURE — 80053 COMPREHEN METABOLIC PANEL: CPT

## 2025-02-20 PROCEDURE — 85025 COMPLETE CBC W/AUTO DIFF WBC: CPT

## 2025-02-20 PROCEDURE — 83880 ASSAY OF NATRIURETIC PEPTIDE: CPT

## 2025-02-20 PROCEDURE — 93306 TTE W/DOPPLER COMPLETE: CPT

## 2025-02-20 PROCEDURE — 85610 PROTHROMBIN TIME: CPT

## 2025-02-20 PROCEDURE — 93306 TTE W/DOPPLER COMPLETE: CPT | Mod: 26

## 2025-02-20 PROCEDURE — 86077 PHYS BLOOD BANK SERV XMATCH: CPT

## 2025-02-24 ENCOUNTER — NON-APPOINTMENT (OUTPATIENT)
Age: 80
End: 2025-02-24

## 2025-03-10 ENCOUNTER — NON-APPOINTMENT (OUTPATIENT)
Age: 80
End: 2025-03-10

## 2025-03-10 ENCOUNTER — APPOINTMENT (OUTPATIENT)
Dept: CARDIOTHORACIC SURGERY | Facility: CLINIC | Age: 80
End: 2025-03-10
Payer: COMMERCIAL

## 2025-03-10 ENCOUNTER — RESULT REVIEW (OUTPATIENT)
Age: 80
End: 2025-03-10

## 2025-03-10 ENCOUNTER — APPOINTMENT (OUTPATIENT)
Dept: ULTRASOUND IMAGING | Facility: HOSPITAL | Age: 80
End: 2025-03-10

## 2025-03-10 ENCOUNTER — OUTPATIENT (OUTPATIENT)
Dept: OUTPATIENT SERVICES | Facility: HOSPITAL | Age: 80
LOS: 1 days | End: 2025-03-10
Payer: COMMERCIAL

## 2025-03-10 ENCOUNTER — APPOINTMENT (OUTPATIENT)
Dept: CT IMAGING | Facility: HOSPITAL | Age: 80
End: 2025-03-10

## 2025-03-10 VITALS
DIASTOLIC BLOOD PRESSURE: 61 MMHG | OXYGEN SATURATION: 99 % | SYSTOLIC BLOOD PRESSURE: 122 MMHG | TEMPERATURE: 96.8 F | WEIGHT: 115 LBS | BODY MASS INDEX: 18.48 KG/M2 | HEIGHT: 66 IN | HEART RATE: 77 BPM

## 2025-03-10 DIAGNOSIS — Z87.891 PERSONAL HISTORY OF NICOTINE DEPENDENCE: ICD-10-CM

## 2025-03-10 DIAGNOSIS — G62.9 POLYNEUROPATHY, UNSPECIFIED: ICD-10-CM

## 2025-03-10 DIAGNOSIS — Z85.43 PERSONAL HISTORY OF MALIGNANT NEOPLASM OF OVARY: ICD-10-CM

## 2025-03-10 PROCEDURE — 93880 EXTRACRANIAL BILAT STUDY: CPT

## 2025-03-10 PROCEDURE — 82565 ASSAY OF CREATININE: CPT

## 2025-03-10 PROCEDURE — 75572 CT HRT W/3D IMAGE: CPT

## 2025-03-10 PROCEDURE — 74174 CTA ABD&PLVS W/CONTRAST: CPT

## 2025-03-10 PROCEDURE — 74174 CTA ABD&PLVS W/CONTRAST: CPT | Mod: 26

## 2025-03-10 PROCEDURE — 75572 CT HRT W/3D IMAGE: CPT | Mod: 26

## 2025-03-10 PROCEDURE — 93880 EXTRACRANIAL BILAT STUDY: CPT | Mod: 26

## 2025-03-10 PROCEDURE — 99204 OFFICE O/P NEW MOD 45 MIN: CPT

## 2025-03-10 RX ORDER — GABAPENTIN 100 MG/1
100 CAPSULE ORAL TWICE DAILY
Refills: 0 | Status: ACTIVE | COMMUNITY

## 2025-03-13 RX ORDER — MULTIVIT-MIN/IRON/FOLIC ACID/K 18-600-40
CAPSULE ORAL
Refills: 0 | Status: ACTIVE | COMMUNITY

## 2025-03-13 RX ORDER — MECOBALAMIN 1000 MCG
TABLET,CHEWABLE ORAL
Refills: 0 | Status: ACTIVE | COMMUNITY

## 2025-03-13 RX ORDER — ACETAMINOPHEN 325 MG
TABLET ORAL
Refills: 0 | Status: ACTIVE | COMMUNITY

## 2025-03-17 ENCOUNTER — APPOINTMENT (OUTPATIENT)
Dept: CARDIOTHORACIC SURGERY | Facility: CLINIC | Age: 80
End: 2025-03-17
Payer: COMMERCIAL

## 2025-03-17 PROCEDURE — 99204 OFFICE O/P NEW MOD 45 MIN: CPT | Mod: 95

## 2025-03-25 ENCOUNTER — NON-APPOINTMENT (OUTPATIENT)
Age: 80
End: 2025-03-25

## 2025-04-22 ENCOUNTER — OUTPATIENT (OUTPATIENT)
Dept: OUTPATIENT SERVICES | Facility: HOSPITAL | Age: 80
LOS: 1 days | End: 2025-04-22
Payer: COMMERCIAL

## 2025-04-22 DIAGNOSIS — Z01.818 ENCOUNTER FOR OTHER PREPROCEDURAL EXAMINATION: ICD-10-CM

## 2025-04-22 LAB
ALBUMIN SERPL ELPH-MCNC: 4.3 G/DL — SIGNIFICANT CHANGE UP (ref 3.3–5)
ALP SERPL-CCNC: 109 U/L — SIGNIFICANT CHANGE UP (ref 40–120)
ALT FLD-CCNC: 14 U/L — SIGNIFICANT CHANGE UP (ref 10–45)
ANION GAP SERPL CALC-SCNC: 13 MMOL/L — SIGNIFICANT CHANGE UP (ref 5–17)
APTT BLD: 30.4 SEC — SIGNIFICANT CHANGE UP (ref 24.5–35.6)
AST SERPL-CCNC: 25 U/L — SIGNIFICANT CHANGE UP (ref 10–40)
BASOPHILS # BLD AUTO: 0.06 K/UL — SIGNIFICANT CHANGE UP (ref 0–0.2)
BASOPHILS NFR BLD AUTO: 1.5 % — SIGNIFICANT CHANGE UP (ref 0–2)
BILIRUB SERPL-MCNC: 0.5 MG/DL — SIGNIFICANT CHANGE UP (ref 0.2–1.2)
BLD GP AB SCN SERPL QL: POSITIVE — SIGNIFICANT CHANGE UP
BUN SERPL-MCNC: 15 MG/DL — SIGNIFICANT CHANGE UP (ref 7–23)
CALCIUM SERPL-MCNC: 9.3 MG/DL — SIGNIFICANT CHANGE UP (ref 8.4–10.5)
CHLORIDE SERPL-SCNC: 104 MMOL/L — SIGNIFICANT CHANGE UP (ref 96–108)
CO2 SERPL-SCNC: 28 MMOL/L — SIGNIFICANT CHANGE UP (ref 22–31)
CREAT SERPL-MCNC: 0.76 MG/DL — SIGNIFICANT CHANGE UP (ref 0.5–1.3)
EGFR: 80 ML/MIN/1.73M2 — SIGNIFICANT CHANGE UP
EGFR: 80 ML/MIN/1.73M2 — SIGNIFICANT CHANGE UP
EOSINOPHIL # BLD AUTO: 0.22 K/UL — SIGNIFICANT CHANGE UP (ref 0–0.5)
EOSINOPHIL NFR BLD AUTO: 5.4 % — SIGNIFICANT CHANGE UP (ref 0–6)
GLUCOSE SERPL-MCNC: 101 MG/DL — HIGH (ref 70–99)
HCT VFR BLD CALC: 33.8 % — LOW (ref 34.5–45)
HGB BLD-MCNC: 12 G/DL — SIGNIFICANT CHANGE UP (ref 11.5–15.5)
IMM GRANULOCYTES NFR BLD AUTO: 0.2 % — SIGNIFICANT CHANGE UP (ref 0–0.9)
INR BLD: 0.92 — SIGNIFICANT CHANGE UP (ref 0.85–1.16)
LYMPHOCYTES # BLD AUTO: 0.85 K/UL — LOW (ref 1–3.3)
LYMPHOCYTES # BLD AUTO: 20.8 % — SIGNIFICANT CHANGE UP (ref 13–44)
MCHC RBC-ENTMCNC: 32.8 PG — SIGNIFICANT CHANGE UP (ref 27–34)
MCHC RBC-ENTMCNC: 35.5 G/DL — SIGNIFICANT CHANGE UP (ref 32–36)
MCV RBC AUTO: 92.3 FL — SIGNIFICANT CHANGE UP (ref 80–100)
MONOCYTES # BLD AUTO: 0.42 K/UL — SIGNIFICANT CHANGE UP (ref 0–0.9)
MONOCYTES NFR BLD AUTO: 10.3 % — SIGNIFICANT CHANGE UP (ref 2–14)
NEUTROPHILS # BLD AUTO: 2.52 K/UL — SIGNIFICANT CHANGE UP (ref 1.8–7.4)
NEUTROPHILS NFR BLD AUTO: 61.8 % — SIGNIFICANT CHANGE UP (ref 43–77)
NRBC BLD AUTO-RTO: 0 /100 WBCS — SIGNIFICANT CHANGE UP (ref 0–0)
PLATELET # BLD AUTO: 161 K/UL — SIGNIFICANT CHANGE UP (ref 150–400)
POTASSIUM SERPL-MCNC: 4.4 MMOL/L — SIGNIFICANT CHANGE UP (ref 3.5–5.3)
POTASSIUM SERPL-SCNC: 4.4 MMOL/L — SIGNIFICANT CHANGE UP (ref 3.5–5.3)
PROT SERPL-MCNC: 6.8 G/DL — SIGNIFICANT CHANGE UP (ref 6–8.3)
PROTHROM AB SERPL-ACNC: 10.6 SEC — SIGNIFICANT CHANGE UP (ref 9.9–13.4)
RBC # BLD: 3.66 M/UL — LOW (ref 3.8–5.2)
RBC # FLD: 13.2 % — SIGNIFICANT CHANGE UP (ref 10.3–14.5)
RH IG SCN BLD-IMP: POSITIVE — SIGNIFICANT CHANGE UP
SODIUM SERPL-SCNC: 145 MMOL/L — SIGNIFICANT CHANGE UP (ref 135–145)
WBC # BLD: 4.08 K/UL — SIGNIFICANT CHANGE UP (ref 3.8–10.5)
WBC # FLD AUTO: 4.08 K/UL — SIGNIFICANT CHANGE UP (ref 3.8–10.5)

## 2025-04-22 PROCEDURE — 86850 RBC ANTIBODY SCREEN: CPT

## 2025-04-22 PROCEDURE — 85610 PROTHROMBIN TIME: CPT

## 2025-04-22 PROCEDURE — 86077 PHYS BLOOD BANK SERV XMATCH: CPT

## 2025-04-22 PROCEDURE — 86901 BLOOD TYPING SEROLOGIC RH(D): CPT

## 2025-04-22 PROCEDURE — 86870 RBC ANTIBODY IDENTIFICATION: CPT

## 2025-04-22 PROCEDURE — 85025 COMPLETE CBC W/AUTO DIFF WBC: CPT

## 2025-04-22 PROCEDURE — 80053 COMPREHEN METABOLIC PANEL: CPT

## 2025-04-22 PROCEDURE — 86880 COOMBS TEST DIRECT: CPT

## 2025-04-22 PROCEDURE — 85730 THROMBOPLASTIN TIME PARTIAL: CPT

## 2025-04-22 PROCEDURE — 86900 BLOOD TYPING SEROLOGIC ABO: CPT

## 2025-04-23 VITALS
DIASTOLIC BLOOD PRESSURE: 58 MMHG | RESPIRATION RATE: 15 BRPM | WEIGHT: 115.08 LBS | TEMPERATURE: 97 F | HEIGHT: 65 IN | OXYGEN SATURATION: 100 % | HEART RATE: 68 BPM | SYSTOLIC BLOOD PRESSURE: 133 MMHG

## 2025-04-23 NOTE — PRE-OP CHECKLIST - LOOSE TEETH
68 yo M with h/o melanoma and asthma here for right occipital wide local incision, flap, and SLNBx    Relevant Problems   PULMONARY   (+) Asthma      Other   (+) Melanoma (HCC)       Physical Exam    Airway   Mallampati: II  TM distance: >3 FB  Neck ROM: full       Cardiovascular - normal exam  Rhythm: regular  Rate: normal  (-) murmur     Dental - normal exam           Pulmonary - normal exam  Breath sounds clear to auscultation     Abdominal   (-) obese     Neurological - normal exam               Anesthesia Plan    ASA 2       Plan - general       Airway plan will be ETT  (Prone view)      Induction: intravenous    Postoperative Plan: Postoperative administration of opioids is intended.    Pertinent diagnostic labs and testing reviewed    Informed Consent:    Anesthetic plan and risks discussed with patient.    Use of blood products discussed with: patient whom consented to blood products.         
no

## 2025-04-23 NOTE — PATIENT PROFILE ADULT - DO YOU FEEL THREATENED BY OTHERS?
FOLLOW-UP  It is recommended you schedule a follow-up appointment with Dr. Dragan Shultz around No follow-ups on file. .    Office hours are 8:00 am to 5:00 pm Monday through Friday. If it is urgent that you speak with someone outside of these hours, the [facility] will be able to assist you. You can reach the office by calling [phone]. Thank you for choosing Lyudmila Bateman MD as your Endocrinology provider. At [facility], one important tool we use to improve our patient services is our Patient Survey. Following your visit you may receive our survey in the mail. Â· Please take the time to complete the survey. Â· If your visit with us was great, we want to hear about it. Â· If we can improve, please let us know how.
no

## 2025-04-23 NOTE — PATIENT PROFILE ADULT - TRANSPORTATION
Subjective   Patient ID: Ashley is a 82 year old female.    Chief Complaint   Patient presents with   • Medicare Wellness Visit     360   • Convey Results       THIS IS A 360 VISIT    HPI     83 y/o F with HTN, Breast CA (right breast mastectomy 5 years ago), Left LE DVT, Pt chronic lower back pain S/p Laminectomy 1 years ago. Still unable to \"walk straight,\" falls off balance . She has done all the exercises.  Pt with hx of left popliteal dvt, present on Doppler US 11/2019. Repeated 1/10/20, no longer visible.  Pt has a follow up with Dr. Moya to discuss discontinuing anticoagulation. Hx of breast cancer, stable. She denies any chest pains, shortness of breath, nausea vomiting or constipation.  Does complain of chronic lower back pain.  Patient is inquiring about possible Medical marijuana.      Review of Systems   All other systems reviewed and are negative.      Current Problem List:  Patient Active Problem List   Diagnosis   • CKD (chronic kidney disease), stage III (CMS/HCC)   • Chronic kidney disease, stage III (moderate) (CMS/HCC)   • Degeneration of intervertebral disc at L5-S1 level   • Facet degeneration of lumbar region   • History of breast cancer   • Essential hypertension   • Hyperlipidemia   • Old myocardial infarction   • Osteoarthrosis of knee   • Atherosclerosis of aorta (CMS/HCC)   • Lumbar stenosis   • S/P laminectomy   • Anemia   • Weight loss   • Soft tissue disorder   • S/P flap graft   • Back pain   • Unspecified inflammatory spondylopathy, lumbar region (CMS/HCC)   • Open wound of back   • PVD (peripheral vascular disease) (CMS/HCC)   • Chronic deep vein thrombosis (DVT) of popliteal vein of left lower extremity (CMS/HCC)       Past Medical History:   Diagnosis Date   • Benign hypertensive kidney disease with chronic kidney disease stage I through stage IV, or unspecified(403.10)    • Breast cancer (CMS/HCC)    • Chronic renal disease, stage III (CMS/HCC)    • Essential (primary)  hypertension    • Hyperlipidemia        Past Surgical History:   Procedure Laterality Date   • Colonoscopy     • Joint replacement     • Spine surgery          Social History     Tobacco Use   • Smoking status: Never Smoker   • Smokeless tobacco: Never Used   Substance Use Topics   • Alcohol use: No     Frequency: Never   • Drug use: No       Current Medications    AMLODIPINE (NORVASC) 10 MG TABLET    Take 1 tablet by mouth daily.    APIXABAN (ELIQUIS) 5 MG TAB    Take 1 tablet by mouth every 12 hours.    CARVEDILOL (COREG) 6.25 MG TABLET    Take 1 tablet by mouth 2 times daily (with meals).    FERROUS SULFATE 325 (65 FE) MG TABLET    Take 325 mg by mouth daily (with breakfast).    LOSARTAN (COZAAR) 100 MG TABLET    Take 1 tablet by mouth daily.    PRAVASTATIN (PRAVACHOL) 20 MG TABLET      20 MG = 1 TAB, PO, Daily, # 90 TAB, 3 Refill(s), 07/09/18 9:16:00 CDT, Pharmacy: [facility]/pharmacy #[id number]    SENNOSIDES (SENNA) 8.6 MG CAP    Take 8.6 mg by mouth as needed (For symptoms of constipation).    TRAMADOL (ULTRAM) 50 MG TABLET    Take 1 tablet by mouth daily as needed for Pain. TAKE 1 TABLET BY MOUTH TWICE DAILY AS NEEDED FOR PAIN    VENLAFAXINE 37.5 MG TABLET SR 24 HR      37.5 MG = 1 TAB, PO, Daily, # 30 TAB, 1 Refill(s), 07/09/18 9:17:00 CDT, Pharmacy: [facility]/pharmacy #[id number]       Screenings  Pain:       ADLs            Mobility Assist Devices: Cane  Are you deaf or do you have serious difficulty  hearing? : No  Are you blind or do you have serious difficulty seeing, even when wearing glasses?: Yes  Sensory Support Devices: Eyeglasses    iADLs  Using Telephone: Independent  Grocery Shopping: Independent  Preparing Meals: Independent  Doing Housework: Independent  Laundry: Independent  Taking Medication: Independent  Pill Box Used: No  Managing Finances: Independent  Using Transportation: Independent  Still Driving: No  Eating: Independent  Needs Assistance With Food: Independent  Difficulty Chewing or Swallowing:  No    Depression PHQ2/9:  Little interest or pleasure in activity?: Not at all  Feeling down, depressed or hopeless?: Not at all  Initial depression screening score:: 0         Depression assessment/plan: Depression screening is negative no further plan needed.     Cognitive/Functional Status:  Are you deaf or do you have serious difficulty  hearing? : No  Are you blind or do you have serious difficulty seeing, even when wearing glasses?: Yes  Are you blind or do you have serious difficulty seeing, even when wearing glasses?: Yes  Because of a physical, mental, or emotional condition, do you have serious difficulty concentrating, remembering or making decisions? : No  Do you have serious difficulty walking or climbing stairs?: Yes  Do you have difficulty dressing or bathing?: No  Because of a physical, mental, or emotional condition, do you have difficulty doing errands alone?: Yes       STEADI-Fall Risk       Hearing Impairment: Are you deaf or do you have serious difficulty  hearing? : No    Vision Impairment: Are you blind or do you have serious difficulty seeing, even when wearing glasses?: Yes    Vision/Hearing Screening: No exam data present     Speech Impairment: No    Urinary Incontinence:  Over the past 4 weeks how often have you experienced bladder control problems? Never    Objective   Vitals:    01/13/20 1101   BP: 122/70   Pulse: 71   Resp: 18   Temp: 97.8 °F (36.6 °C)   TempSrc: Oral   SpO2: 98%   Weight: 63.5 kg (140 lb)   Height: 5' 3\" (1.6 m)      Physical Exam  Vitals signs and nursing note reviewed.   Constitutional:       Appearance: Normal appearance.   HENT:      Right Ear: Tympanic membrane, ear canal and external ear normal.      Left Ear: Tympanic membrane, ear canal and external ear normal.   Neck:      Musculoskeletal: Normal range of motion.   Cardiovascular:      Rate and Rhythm: Normal rate and regular rhythm.      Heart sounds: No murmur.   Pulmonary:      Effort: Pulmonary effort is  normal. No respiratory distress.      Breath sounds: Normal breath sounds.   Skin:     General: Skin is warm.   Neurological:      General: No focal deficit present.      Mental Status: She is alert and oriented to person, place, and time.      Gait: Gait abnormal.      Comments: Holding on to furniture, off balance on ambulation to table       VASC ADVOCATE PROCEDURE  Narrative: Accession #    [id number]    EXAM: VASC EXT LOWR VENOUS DPLX LT    CLINICAL INDICATION: Left lower extremity swelling.    COMPARISON: 11/06/2019    TECHNIQUE: Duplex compression technique used    FINDINGS:    Duplex compression technique has been used to examine the common femoral through popliteal veins.    Complete compressibility is documented. The proximal profunda and greater saphenous veins   appear patent.   Visualized calf veins are patent  Impression: No evidence of deep venous thrombosis.    If symptoms suggesting DVT persist, consider a follow-up ultrasound study at one week.  Two   negative exams one week apart is considered sufficient to exclude DVT. (The rationale for the   repeat exams is that an undetectable calf vein thrombus may propagate superiorly and become a   threat for clinically significant pulmonary embolism, but will be sonographically detectable   once it reaches the popliteal vein.)    FOR PHYSICIAN USE ONLY - Please note that this report was generated using voice recognition   software.  If you require clarification or feel that there has been an error in this report   please contact me through [employer].  Thank you very much for allowing me to participate in   the care of your patient.    -----  F I N A L  -----    Transcribed By: JEANMARIE   01/10/20 2:40 pm    Dictated By:            KENNY DALY MD    Electronically Reviewed and Approved By:           KENNY DALY MD  01/10/20 2:42 pm    Assessment   Problem List Items Addressed This Visit        Circulatory    Essential hypertension    Relevant  Orders    LIPID PANEL WITH REFLEX    PVD (peripheral vascular disease) (CMS/[facility])    Relevant Orders    SERVICE TO CARDIOLOGY       Urinary    CKD (chronic kidney disease), stage III (CMS/[facility])       Musculoskeletal    Back pain    Relevant Orders    SERVICE TO PHYSICAL THERAPY    Unspecified inflammatory spondylopathy, lumbar region (CMS/[facility])      Other Visit Diagnoses     Balance disorder    -  Primary    Relevant Orders    SERVICE TO PHYSICAL THERAPY    Annual physical exam              360 Assessment Plan:     1.  Annual exam  2.  Hypertension-well-controlled  3.  PVD- I reviewed her last arterial Dopplers with her which showed bilateral lower extremity stenosis.  She was seen by vascular surgery in the past, but did not want to go back to the same provider.  Will refer back to Dr. Cruz, cardiology for further intervention as this may be causing her lower extremity discomfort.  4.  Unspecified inflammatory spondylopathy, lumbar region/ sp laminectomy- will refer patient to pain management, may be a candidate for medical marijuana  5.  CKD 3-stable avoid NSAIDs, continue hydration  6. Balance disorder/ Chronic bilateral lower back pain w/o sciatica-falls precaution discussed, has a cane which helps with her stability.  Will refer to outpatient PT for further evaluation.  7. History of breast cancer-stable at this time, Mammogram done 10/2019 BIRADS 2, Follow-up with hematology oncology.  8.  History of DVT to lower extremity-now resolved on her most recent lower extremity ultrasound.  Follow-up with heme-onc to discuss if she needs to continue her Eliquis.  If she were to be taken off of Eliquis, I advised her to start aspirin 81 mg 1 tab p.o. daily.  9.  Healthcare maintenance-patient is up-to-date with her osteoporosis screening to be done again next year, pneumonia vaccine for next visit.  Patient declined flu shot today.  Advised to obtain her shingles vaccine OTC    Advance Care Planning Addressed: Advance  Directives. Advance Care Planning discussed with patient.   Handouts (given to patient): Written handout given.   Health Maintenance (discussed and/or reviewed):   · Patient Education  · Medication needs  · Social Concerns  · Patient does not require behavioral or case management referrals or other disease specific interventions  Patient Education (taught and/or recommended):  · Patient educated on disease process, etiology & prognosis  · Proper usage and side effects of medications reviewed & discussed  · Medical compliance with plan discussed and risks of non-compliance reviewed  · Return to clinic as clinically indicated as discussed with patient who verbalized understanding of & agreement with the plan    Schedule follow up: in 3 months   no

## 2025-04-24 ENCOUNTER — INPATIENT (INPATIENT)
Facility: HOSPITAL | Age: 80
LOS: 0 days | Discharge: HOME HEALTH | DRG: 267 | End: 2025-04-25
Attending: INTERNAL MEDICINE | Admitting: INTERNAL MEDICINE
Payer: COMMERCIAL

## 2025-04-24 ENCOUNTER — APPOINTMENT (OUTPATIENT)
Dept: CARDIOTHORACIC SURGERY | Facility: HOSPITAL | Age: 80
End: 2025-04-24

## 2025-04-24 ENCOUNTER — TRANSCRIPTION ENCOUNTER (OUTPATIENT)
Age: 80
End: 2025-04-24

## 2025-04-24 DIAGNOSIS — Z90.710 ACQUIRED ABSENCE OF BOTH CERVIX AND UTERUS: Chronic | ICD-10-CM

## 2025-04-24 LAB
ALBUMIN SERPL ELPH-MCNC: 3.9 G/DL — SIGNIFICANT CHANGE UP (ref 3.3–5)
ALBUMIN SERPL ELPH-MCNC: 4.6 G/DL — SIGNIFICANT CHANGE UP (ref 3.3–5)
ALP SERPL-CCNC: 106 U/L — SIGNIFICANT CHANGE UP (ref 40–120)
ALP SERPL-CCNC: 98 U/L — SIGNIFICANT CHANGE UP (ref 40–120)
ALT FLD-CCNC: 13 U/L — SIGNIFICANT CHANGE UP (ref 10–45)
ALT FLD-CCNC: 15 U/L — SIGNIFICANT CHANGE UP (ref 10–45)
ANION GAP SERPL CALC-SCNC: 10 MMOL/L — SIGNIFICANT CHANGE UP (ref 5–17)
ANION GAP SERPL CALC-SCNC: 13 MMOL/L — SIGNIFICANT CHANGE UP (ref 5–17)
APTT BLD: 30 SEC — SIGNIFICANT CHANGE UP (ref 26.1–36.8)
APTT BLD: 57.8 SEC — HIGH (ref 26.1–36.8)
AST SERPL-CCNC: 26 U/L — SIGNIFICANT CHANGE UP (ref 10–40)
AST SERPL-CCNC: 28 U/L — SIGNIFICANT CHANGE UP (ref 10–40)
BASE EXCESS BLDA CALC-SCNC: -1.5 MMOL/L — SIGNIFICANT CHANGE UP (ref -2–3)
BASOPHILS # BLD AUTO: 0.15 K/UL — SIGNIFICANT CHANGE UP (ref 0–0.2)
BASOPHILS NFR BLD AUTO: 1.7 % — SIGNIFICANT CHANGE UP (ref 0–2)
BILIRUB SERPL-MCNC: 0.5 MG/DL — SIGNIFICANT CHANGE UP (ref 0.2–1.2)
BILIRUB SERPL-MCNC: 0.7 MG/DL — SIGNIFICANT CHANGE UP (ref 0.2–1.2)
BLD GP AB SCN SERPL QL: POSITIVE — SIGNIFICANT CHANGE UP
BUN SERPL-MCNC: 16 MG/DL — SIGNIFICANT CHANGE UP (ref 7–23)
BUN SERPL-MCNC: 17 MG/DL — SIGNIFICANT CHANGE UP (ref 7–23)
CALCIUM SERPL-MCNC: 8.9 MG/DL — SIGNIFICANT CHANGE UP (ref 8.4–10.5)
CALCIUM SERPL-MCNC: 9.7 MG/DL — SIGNIFICANT CHANGE UP (ref 8.4–10.5)
CHLORIDE SERPL-SCNC: 103 MMOL/L — SIGNIFICANT CHANGE UP (ref 96–108)
CHLORIDE SERPL-SCNC: 107 MMOL/L — SIGNIFICANT CHANGE UP (ref 96–108)
CO2 BLDA-SCNC: 24 MMOL/L — SIGNIFICANT CHANGE UP (ref 19–24)
CO2 SERPL-SCNC: 23 MMOL/L — SIGNIFICANT CHANGE UP (ref 22–31)
CO2 SERPL-SCNC: 25 MMOL/L — SIGNIFICANT CHANGE UP (ref 22–31)
CREAT SERPL-MCNC: 0.72 MG/DL — SIGNIFICANT CHANGE UP (ref 0.5–1.3)
CREAT SERPL-MCNC: 0.77 MG/DL — SIGNIFICANT CHANGE UP (ref 0.5–1.3)
EGFR: 79 ML/MIN/1.73M2 — SIGNIFICANT CHANGE UP
EGFR: 79 ML/MIN/1.73M2 — SIGNIFICANT CHANGE UP
EGFR: 86 ML/MIN/1.73M2 — SIGNIFICANT CHANGE UP
EGFR: 86 ML/MIN/1.73M2 — SIGNIFICANT CHANGE UP
EOSINOPHIL # BLD AUTO: 0.08 K/UL — SIGNIFICANT CHANGE UP (ref 0–0.5)
EOSINOPHIL NFR BLD AUTO: 0.9 % — SIGNIFICANT CHANGE UP (ref 0–6)
GLUCOSE SERPL-MCNC: 109 MG/DL — HIGH (ref 70–99)
GLUCOSE SERPL-MCNC: 122 MG/DL — HIGH (ref 70–99)
HCO3 BLDA-SCNC: 23 MMOL/L — SIGNIFICANT CHANGE UP (ref 21–28)
HCT VFR BLD CALC: 30.2 % — LOW (ref 34.5–45)
HCT VFR BLD CALC: 34.7 % — SIGNIFICANT CHANGE UP (ref 34.5–45)
HGB BLD-MCNC: 11.1 G/DL — LOW (ref 11.5–15.5)
HGB BLD-MCNC: 12.1 G/DL — SIGNIFICANT CHANGE UP (ref 11.5–15.5)
INR BLD: 0.99 — SIGNIFICANT CHANGE UP (ref 0.85–1.16)
INR BLD: 1.12 — SIGNIFICANT CHANGE UP (ref 0.85–1.16)
LYMPHOCYTES # BLD AUTO: 0.3 K/UL — LOW (ref 1–3.3)
LYMPHOCYTES # BLD AUTO: 3.5 % — LOW (ref 13–44)
MAGNESIUM SERPL-MCNC: 1.9 MG/DL — SIGNIFICANT CHANGE UP (ref 1.6–2.6)
MCHC RBC-ENTMCNC: 31.8 PG — SIGNIFICANT CHANGE UP (ref 27–34)
MCHC RBC-ENTMCNC: 33.6 PG — SIGNIFICANT CHANGE UP (ref 27–34)
MCHC RBC-ENTMCNC: 34.9 G/DL — SIGNIFICANT CHANGE UP (ref 32–36)
MCHC RBC-ENTMCNC: 36.8 G/DL — HIGH (ref 32–36)
MCV RBC AUTO: 91.1 FL — SIGNIFICANT CHANGE UP (ref 80–100)
MCV RBC AUTO: 91.5 FL — SIGNIFICANT CHANGE UP (ref 80–100)
MONOCYTES # BLD AUTO: 0.22 K/UL — SIGNIFICANT CHANGE UP (ref 0–0.9)
MONOCYTES NFR BLD AUTO: 2.6 % — SIGNIFICANT CHANGE UP (ref 2–14)
NEUTROPHILS # BLD AUTO: 7.82 K/UL — HIGH (ref 1.8–7.4)
NEUTROPHILS NFR BLD AUTO: 91.3 % — HIGH (ref 43–77)
NRBC BLD AUTO-RTO: 0 /100 WBCS — SIGNIFICANT CHANGE UP (ref 0–0)
NT-PROBNP SERPL-SCNC: 763 PG/ML — HIGH (ref 0–300)
PCO2 BLDA: 37 MMHG — SIGNIFICANT CHANGE UP (ref 32–45)
PH BLDA: 7.4 — SIGNIFICANT CHANGE UP (ref 7.35–7.45)
PHOSPHATE SERPL-MCNC: 3.7 MG/DL — SIGNIFICANT CHANGE UP (ref 2.5–4.5)
PLATELET # BLD AUTO: 148 K/UL — LOW (ref 150–400)
PLATELET # BLD AUTO: 167 K/UL — SIGNIFICANT CHANGE UP (ref 150–400)
PO2 BLDA: 80 MMHG — LOW (ref 83–108)
POTASSIUM SERPL-MCNC: 4.2 MMOL/L — SIGNIFICANT CHANGE UP (ref 3.5–5.3)
POTASSIUM SERPL-MCNC: 4.5 MMOL/L — SIGNIFICANT CHANGE UP (ref 3.5–5.3)
POTASSIUM SERPL-SCNC: 4.2 MMOL/L — SIGNIFICANT CHANGE UP (ref 3.5–5.3)
POTASSIUM SERPL-SCNC: 4.5 MMOL/L — SIGNIFICANT CHANGE UP (ref 3.5–5.3)
PROT SERPL-MCNC: 6.2 G/DL — SIGNIFICANT CHANGE UP (ref 6–8.3)
PROT SERPL-MCNC: 7 G/DL — SIGNIFICANT CHANGE UP (ref 6–8.3)
PROTHROM AB SERPL-ACNC: 11.6 SEC — SIGNIFICANT CHANGE UP (ref 9.9–13.4)
PROTHROM AB SERPL-ACNC: 13.1 SEC — SIGNIFICANT CHANGE UP (ref 9.9–13.4)
RBC # BLD: 3.3 M/UL — LOW (ref 3.8–5.2)
RBC # BLD: 3.81 M/UL — SIGNIFICANT CHANGE UP (ref 3.8–5.2)
RBC # FLD: 13.1 % — SIGNIFICANT CHANGE UP (ref 10.3–14.5)
RBC # FLD: 13.2 % — SIGNIFICANT CHANGE UP (ref 10.3–14.5)
RH IG SCN BLD-IMP: POSITIVE — SIGNIFICANT CHANGE UP
SAO2 % BLDA: 97.7 % — SIGNIFICANT CHANGE UP (ref 94–98)
SODIUM SERPL-SCNC: 139 MMOL/L — SIGNIFICANT CHANGE UP (ref 135–145)
SODIUM SERPL-SCNC: 142 MMOL/L — SIGNIFICANT CHANGE UP (ref 135–145)
WBC # BLD: 4.33 K/UL — SIGNIFICANT CHANGE UP (ref 3.8–10.5)
WBC # BLD: 8.57 K/UL — SIGNIFICANT CHANGE UP (ref 3.8–10.5)
WBC # FLD AUTO: 4.33 K/UL — SIGNIFICANT CHANGE UP (ref 3.8–10.5)
WBC # FLD AUTO: 8.57 K/UL — SIGNIFICANT CHANGE UP (ref 3.8–10.5)

## 2025-04-24 PROCEDURE — 33361 REPLACE AORTIC VALVE PERQ: CPT | Mod: 62,Q0

## 2025-04-24 PROCEDURE — 93010 ELECTROCARDIOGRAM REPORT: CPT

## 2025-04-24 PROCEDURE — 99291 CRITICAL CARE FIRST HOUR: CPT

## 2025-04-24 PROCEDURE — 33370 TCAT PLMT&RMVL CEPD PERQ: CPT

## 2025-04-24 PROCEDURE — 33370 TCAT PLMT&RMVL CEPD PERQ: CPT | Mod: 80

## 2025-04-24 PROCEDURE — 71045 X-RAY EXAM CHEST 1 VIEW: CPT | Mod: 26

## 2025-04-24 DEVICE — CATH DX AL1 INFIN 5FRX100CM: Type: IMPLANTABLE DEVICE | Status: FUNCTIONAL

## 2025-04-24 DEVICE — ANGIOSEAL VASC CLOS VIP 8FR: Type: IMPLANTABLE DEVICE | Status: FUNCTIONAL

## 2025-04-24 DEVICE — SUT PERCLOSE PROGLIDE 6FR: Type: IMPLANTABLE DEVICE | Status: FUNCTIONAL

## 2025-04-24 DEVICE — GUIDEWIRE STANDARD STRAIGHT .035" X 180CM: Type: IMPLANTABLE DEVICE | Status: FUNCTIONAL

## 2025-04-24 DEVICE — INTRO MICROPUNC 4FRX10CM SS: Type: IMPLANTABLE DEVICE | Status: FUNCTIONAL

## 2025-04-24 DEVICE — INTRODUCER SHEATH KIT GLIDESHEATH SLENDER FLEX STRAIGHT 21G 6F X 10CM: Type: IMPLANTABLE DEVICE | Status: FUNCTIONAL

## 2025-04-24 DEVICE — ANGIO CATH GLIDECATH ANGLE 4FR X 120CM: Type: IMPLANTABLE DEVICE | Status: FUNCTIONAL

## 2025-04-24 DEVICE — VLV TRANS CATH W/COMM SYS SAPIEN 3 ULTRA 23MM: Type: IMPLANTABLE DEVICE | Status: FUNCTIONAL

## 2025-04-24 DEVICE — PACING CATH PACEL RIGHT HEART CURVE 5FR KIT: Type: IMPLANTABLE DEVICE | Status: FUNCTIONAL

## 2025-04-24 DEVICE — EMERALD GUIDEWIRE 0.35: Type: IMPLANTABLE DEVICE | Status: FUNCTIONAL

## 2025-04-24 DEVICE — SYS VASCADE MVP VASCULAR CLOSURE 6-12F: Type: IMPLANTABLE DEVICE | Status: FUNCTIONAL

## 2025-04-24 DEVICE — WIRE GD CONFIDA BRECKER CRV .035X260: Type: IMPLANTABLE DEVICE | Status: FUNCTIONAL

## 2025-04-24 DEVICE — SYS CEREBRAL PROT NCT04149535 SENTINEL FOR STUDY ONLY: Type: IMPLANTABLE DEVICE | Status: FUNCTIONAL

## 2025-04-24 DEVICE — CATH DX PIG 145 INFIN 5FRX110CM: Type: IMPLANTABLE DEVICE | Status: FUNCTIONAL

## 2025-04-24 DEVICE — GWIRE GUID  0.035INX150CM: Type: IMPLANTABLE DEVICE | Status: FUNCTIONAL

## 2025-04-24 DEVICE — SHEATH INTRODUCER TERUMO PINNACLE CORONARY 8FR X 10CM X 0.038" MINI WIRE: Type: IMPLANTABLE DEVICE | Status: FUNCTIONAL

## 2025-04-24 DEVICE — WIRE ASAHI GRAND SLAM 300CM: Type: IMPLANTABLE DEVICE | Status: FUNCTIONAL

## 2025-04-24 DEVICE — GWIRE JTIP 1.5MM .035X180CM: Type: IMPLANTABLE DEVICE | Status: FUNCTIONAL

## 2025-04-24 RX ORDER — BIOTIN 10 MG
0 TABLET ORAL
Refills: 0 | DISCHARGE

## 2025-04-24 RX ORDER — POLYETHYLENE GLYCOL 3350 17 G/17G
17 POWDER, FOR SOLUTION ORAL DAILY
Refills: 0 | Status: DISCONTINUED | OUTPATIENT
Start: 2025-04-24 | End: 2025-04-25

## 2025-04-24 RX ORDER — GABAPENTIN 400 MG/1
200 CAPSULE ORAL ONCE
Refills: 0 | Status: COMPLETED | OUTPATIENT
Start: 2025-04-24 | End: 2025-04-24

## 2025-04-24 RX ORDER — ROSUVASTATIN CALCIUM 20 MG/1
20 TABLET, FILM COATED ORAL AT BEDTIME
Refills: 0 | Status: DISCONTINUED | OUTPATIENT
Start: 2025-04-24 | End: 2025-04-24

## 2025-04-24 RX ORDER — HEPARIN SODIUM 1000 [USP'U]/ML
5000 INJECTION INTRAVENOUS; SUBCUTANEOUS EVERY 8 HOURS
Refills: 0 | Status: DISCONTINUED | OUTPATIENT
Start: 2025-04-24 | End: 2025-04-25

## 2025-04-24 RX ORDER — ASPIRIN 325 MG
81 TABLET ORAL DAILY
Refills: 0 | Status: DISCONTINUED | OUTPATIENT
Start: 2025-04-25 | End: 2025-04-25

## 2025-04-24 RX ORDER — CEFAZOLIN SODIUM IN 0.9 % NACL 3 G/100 ML
2000 INTRAVENOUS SOLUTION, PIGGYBACK (ML) INTRAVENOUS EVERY 8 HOURS
Refills: 0 | Status: COMPLETED | OUTPATIENT
Start: 2025-04-24 | End: 2025-04-25

## 2025-04-24 RX ORDER — MAGNESIUM OXIDE 400 MG
800 TABLET ORAL ONCE
Refills: 0 | Status: COMPLETED | OUTPATIENT
Start: 2025-04-24 | End: 2025-04-24

## 2025-04-24 RX ORDER — ALBUMIN (HUMAN) 12.5 G/50ML
250 INJECTION, SOLUTION INTRAVENOUS ONCE
Refills: 0 | Status: COMPLETED | OUTPATIENT
Start: 2025-04-24 | End: 2025-04-24

## 2025-04-24 RX ORDER — ROSUVASTATIN CALCIUM 20 MG/1
20 TABLET, FILM COATED ORAL AT BEDTIME
Refills: 0 | Status: DISCONTINUED | OUTPATIENT
Start: 2025-04-24 | End: 2025-04-25

## 2025-04-24 RX ORDER — GINGER 500 MG
0 CAPSULE ORAL
Refills: 0 | DISCHARGE

## 2025-04-24 RX ORDER — ACETAMINOPHEN 500 MG/5ML
650 LIQUID (ML) ORAL EVERY 6 HOURS
Refills: 0 | Status: DISCONTINUED | OUTPATIENT
Start: 2025-04-24 | End: 2025-04-25

## 2025-04-24 RX ORDER — SENNA 187 MG
2 TABLET ORAL AT BEDTIME
Refills: 0 | Status: DISCONTINUED | OUTPATIENT
Start: 2025-04-24 | End: 2025-04-25

## 2025-04-24 RX ORDER — GABAPENTIN 400 MG/1
2 CAPSULE ORAL
Refills: 0 | DISCHARGE

## 2025-04-24 RX ORDER — B1/B2/B3/B5/B6/B12/VIT C/FOLIC 500-0.5 MG
0 TABLET ORAL
Refills: 0 | DISCHARGE

## 2025-04-24 RX ORDER — IPRATROPIUM BROMIDE 42 UG/1
2 SPRAY NASAL
Refills: 0 | DISCHARGE

## 2025-04-24 RX ORDER — CYANOCOBALAMIN 1000 UG/ML
1 INJECTION INTRAMUSCULAR; SUBCUTANEOUS
Refills: 0 | DISCHARGE

## 2025-04-24 RX ADMIN — ROSUVASTATIN CALCIUM 20 MILLIGRAM(S): 20 TABLET, FILM COATED ORAL at 21:35

## 2025-04-24 RX ADMIN — ALBUMIN (HUMAN) 500 MILLILITER(S): 12.5 INJECTION, SOLUTION INTRAVENOUS at 19:41

## 2025-04-24 RX ADMIN — Medication 100 MILLIGRAM(S): at 21:35

## 2025-04-24 RX ADMIN — HEPARIN SODIUM 5000 UNIT(S): 1000 INJECTION INTRAVENOUS; SUBCUTANEOUS at 21:35

## 2025-04-24 RX ADMIN — ALBUMIN (HUMAN) 125 MILLILITER(S): 12.5 INJECTION, SOLUTION INTRAVENOUS at 19:08

## 2025-04-24 RX ADMIN — Medication 2 TABLET(S): at 21:36

## 2025-04-24 RX ADMIN — Medication 800 MILLIGRAM(S): at 21:36

## 2025-04-24 NOTE — PRE-ANESTHESIA EVALUATION ADULT - NSANTHADDINFOFT_GEN_ALL_CORE
H&P Adult [Charted Location: [facility] 03PO 09] [Authored: 24-Apr-2025 13:12]- for Visit: [id number], In Progress, Entered, Signed w/additional Signatures Pending, Unsigned - Available to Patient Pending       Patient Identity:  · Birth Sex	Female     History of Present Illness:  Reason for Admission: TAVR  History of Present Illness:   79F with PMH of Fallopian tube/endometrial/endocervical CA s/p DHIRAJ-BSO (2013) followed by chemotherapy/XRT(at [facility]), HTN, HLD, carotid artery disease, small LV with intra-cavitary gradient of 40 mmHg, and severe AS (PV 4.52, P/MG 82/45, BERNADETTE 0.81), referred for consultation of treatment options for severe AS. She denies prior CVA, MI/CAD, bleeding or clotting disorder.    Patient had seen both her gyn oncologist, Dr. Sandoval, who stated that the patient has a life expectancy of over one year; and her pulmonologist who deemed that her pulmonary nodules fpund on her TAVR CTs are all stable.     Note: patient will +Ab screen. Per lab comment: Per Dr. 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 This patient can be transfused with least incompatible blood after exclusion of underlying alloantibodies. Blood bank will need samples at most 3 days prior to planned procedure. They will need 2hrs to find compatible blood if sample is sent the same day.     Patient seen in same day holding area; Reports no changes to PMHx or medications since last seen by our team. Denies acute or current SOB, chest pain, palpitation, N/V/D, fever/chills, recent illness, or any other concerning symptoms. Pt reports nothing to eat or drink since last night. Pt took aspirin 81mg this morning.         Review of Systems:  Review of Systems: Review of Systems  CONSTITUTIONAL:  Denies Fevers / chills, sweats, fatigue, weight loss, weight gain                                      NEURO:  Denies changes in sensation, seizures, syncope, confusion                                                                            EYES:  Denies Blurry vision, discharge, pain, loss of vision                                                                                    ENMT:  Denies Difficulty hearing, vertigo, dysphagia, epistaxis, recent dental work                                       CV:  Denies Chest pain, palpitations, GALDAMEZ, orthopnea                                                                                          RESPIRATORY:  Denies Wheezing, SOB, cough / sputum, hemoptysis                                                                GI:  Denies Nausea, vomiting, diarrhea, constipation, melena, difficulty swallowing                                               : Denies Hematuria, dysuria, urgency, incontinence                                                                                         MUSKULOSKELETAL:  Denies arthritis, joint swelling, muscle weakness                                                             SKIN/BREAST:  Denies rash, itching, hair loss, masses                                                                                            PSYCH:  Denies depression, anxiety, suicidal ideation                                                                                               HEME/LYMPH:  Denies bruises easily, enlarged lymph nodes, tender lymph nodes                                        ENDOCRINE:  Denies cold intolerance, heat intolerance, polydipsia      Allergies and Intolerances:        Allergies:  	penicillin: Drug, Hives  	Keflex: Drug, Hives    Home Medications:   * Patient Currently Takes Medications as of 24-Apr-2025 12:40 documented in Structured Notes  · 	aspirin 81 mg oral delayed release capsule: Last Dose Taken: 23-Apr-2025 PM, orally once a day  · 	Vitamin B12 1000 mcg oral tablet: Last Dose Taken: 23-Apr-2025 PM, 1 tab(s) orally once a day  · 	ascorbic acid: Last Dose Taken: 23-Apr-2025 PM, once a day  · 	multivitamin: Last Dose Taken: 23-Apr-2025 PM, once a day  · 	gabapentin 100 mg oral capsule: Last Dose Taken: 24-Apr-2025 AM, 2 cap(s) orally 3 times a day  · 	losartan 25 mg oral tablet: Last Dose Taken: 23-Apr-2025 PM, 1 tab(s) orally once a day  · 	ipratropium 21 mcg/inh (0.03%) nasal spray: Last Dose Taken: 24-Apr-2025 AM, 2 spray(s) intranasally once a day  · 	Loly: Last Dose Taken: 23-Apr-2025 PM, once a day  · 	rosuvastatin 20 mg oral tablet: Last Dose Taken: 23-Apr-2025 PM, 1 tab(s) orally once a day  · 	Turmeric: Last Dose Taken: 23-Apr-2025 PM, once a day  · 	Biotin: Last Dose Taken: 23-Apr-2025 PM, once a day    Patient History:    Past Medical, Past Surgical, and Family History:  PAST MEDICAL HISTORY:  AS (aortic stenosis)     Cancer of fallopian tube s/p chemo    History of carotid artery disease     HLD (hyperlipidemia)     HTN (hypertension).     PAST SURGICAL HISTORY:  History of total abdominal hysterectomy and bilateral salpingo-oophorectomy 2013.     Social History:  · Substance use	No     Tobacco Screening:  · Core Measure Site	No    Risk Assessment:    Present on Admission:  Deep Venous Thrombosis	no  Pulmonary Embolus	no     HIV Screening:  · In accordance with [place] State law, we offer every patient who comes to our ED an HIV test. Would you like to be tested today?	Opt out     Hepatitis C Test Questions:  · In accordance with [facility] Law, we offer every patient a Hepatitis C test. Would you like to be tested today?	Opt out       Labs and Results:  Labs, Radiology, Cardiology, and Other Results: TTE 12/17/2024CONCLUSIONS:1. Small left ventricular cavity with mild symmetric left ventricular hypertrophy.2. Hyperdynamic left ventricular systolic function, LV EF 74%, LVOT gradient 30 mmHg at rest.3. Normal right ventricular cavity size and systolic function.4. Borderline severe aortic stenosis, BERNADETTE 1.1 cm2, PV 4.25 m/s, MG 38 mmHg).5. Mild mitral regurgitation.6. No echocardiographic evidence of exercise induced ischemia.7. Negative exercise electrocardiography: No ECG evidence of myocardial ischemia.8. Negative for angina or the patient's characteristic chest pain.9. Physiologic blood pressure response to exercise.10. No significant exercise-induced arrhythmias.11. Good functional capacity. Achieved 10 METS.   TTE 2/20/2025CONCLUSIONS:1. Left ventricular cavity is small. Left ventricular systolic function is hyperdynamic with an ejection fraction visuallyestimated at >75 %.2. Hyperdynamic left ventricular systolic function with intra-cavitary gradient of 40 mmHg.3. Normal right ventricular cavity size and normal right ventricular systolic function. Tricuspid annular plane systolicexcursion (TAPSE) is 1.8 cm (normal >=1.7 cm).4. There is severe calcification of the aortic valve leaflets. Severe aortic stenosis.5. The peak transaortic velocity is 4.52 m/s, peak transaortic gradient is 81.7 mmHg and mean transaortic gradientis 45.0 mmHg with an LVOT/aortic valve VTI ratio of 0.26. The effective orifice area is estimated at 0.81 cm by the continuity equation.6. Mild aortic regurgitation.7. No pericardial effusion seen.8. Estimated pulmonary artery systolic pressure is 24 mmHg.9. No prior echocardiogram is available for comparison.    Carotid US 3/7/24  CONCLUSIONS:1.Right: proximal ICA 20-49% stenosis.2.Left: proximal ICA 20-49% stenosis.3.Vertebral arteries: antegrade flow bilaterally.4.Subclavian arteries: no significant atherosclerosis bilaterally    Assessment and Plan:   · Completed VTE Risk Assessment(s)	Surgical Assessment Completed on: 24-Apr-2025 13:14  · Completed VTE Risk Assessment(s)	Refer to the Assessment tab to view/cancel completed assessment.     Assessment:  · Assessment	  79F with PMH of Fallopian tube/endometrial/endocervical CA s/p DHIRAJ-BSO (2013) followed by chemotherapy/XRT(at [facility]), HTN, HLD, carotid artery disease, small LV with intra-cavitary gradient of 40 mmHg, and severe AS (PV 4.52, P/MG 82/45, BERNADETTE 0.81), referred for consultation of treatment options for severe AS.  She presents today for scheduled TAVR.    Plan  - took aspirin 81mg this morning  - admit to 9L post op        Electronic Signatures:  Wendy Johnson)  (Signed 24-Apr-2025 13:26)  	Authored: History of Present Illness, Allergies/Medications, Patient History, Risk Assessment, Labs and Results, Assessment and Plan  Santos Gracia)  (Signature Pending)  	Co-Signer: History of Present Illness, Allergies/Medications, Patient History, Risk Assessment, Labs and Results, Assessment and Plan      Last Updated: 24-Apr-2025 13:26 by Wendy Johnson)

## 2025-04-24 NOTE — BRIEF OPERATIVE NOTE - OPERATION/FINDINGS
TF TAVR with 23mm Jakub   Access: 6F RRA sentinal, LCFV 6F TVP, RCFA , LCFA  Closure: RRA TR Band, LCFV  EKG: preop - intra-op - Postop-  TF TAVR with 23mm Jakub by LCFA  Access: 6F RRA sentinal, RCFA 5F, RCFV 6F, LCFA 14F main  Closure: RRA TR Band, RCFA 1 perclose, RCFV vascaid, LCFA 2 perclose and 1 angioseal  EKG: preop - none, intra-op - none, Postop- none

## 2025-04-24 NOTE — BRIEF OPERATIVE NOTE - SECOND ASSIST LEVEL OF PARTICIPATION
Problem: Risk for Violence/Aggression Toward Others  Goal: Refrain from harming others  Outcome: Progressing Full Procedure

## 2025-04-24 NOTE — H&P ADULT - NSICDXPASTSURGICALHX_GEN_ALL_CORE_FT
PAST SURGICAL HISTORY:  History of total abdominal hysterectomy and bilateral salpingo-oophorectomy 2013

## 2025-04-24 NOTE — H&P ADULT - ASSESSMENT
79F with PMH of Fallopian tube/endometrial/endocervical CA s/p DHIRAJ-BSO (2013) followed by chemotherapy/XRT(at [facility]), HTN, HLD, carotid artery disease, small LV with intra-cavitary gradient of 40 mmHg, and severe AS (PV 4.52, P/MG 82/45, BERNADETTE 0.81), referred for consultation of treatment options for severe AS.  She presents today for scheduled TAVR.    Plan  - took aspirin 81mg this morning  - admit to 9L post op

## 2025-04-24 NOTE — H&P ADULT - NSHPLABSRESULTS_GEN_ALL_CORE
TTE 12/17/2024CONCLUSIONS:1. Small left ventricular cavity with mild symmetric left ventricular hypertrophy.2. Hyperdynamic left ventricular systolic function, LV EF 74%, LVOT gradient 30 mmHg at rest.3. Normal right ventricular cavity size and systolic function.4. Borderline severe aortic stenosis, BERNADETTE 1.1 cm2, PV 4.25 m/s, MG 38 mmHg).5. Mild mitral regurgitation.6. No echocardiographic evidence of exercise induced ischemia.7. Negative exercise electrocardiography: No ECG evidence of myocardial ischemia.8. Negative for angina or the patient's characteristic chest pain.9. Physiologic blood pressure response to exercise.10. No significant exercise-induced arrhythmias.11. Good functional capacity. Achieved 10 METS.   TTE 2/20/2025CONCLUSIONS:1. Left ventricular cavity is small. Left ventricular systolic function is hyperdynamic with an ejection fraction visuallyestimated at >75 %.2. Hyperdynamic left ventricular systolic function with intra-cavitary gradient of 40 mmHg.3. Normal right ventricular cavity size and normal right ventricular systolic function. Tricuspid annular plane systolicexcursion (TAPSE) is 1.8 cm (normal >=1.7 cm).4. There is severe calcification of the aortic valve leaflets. Severe aortic stenosis.5. The peak transaortic velocity is 4.52 m/s, peak transaortic gradient is 81.7 mmHg and mean transaortic gradientis 45.0 mmHg with an LVOT/aortic valve VTI ratio of 0.26. The effective orifice area is estimated at 0.81 cm by the continuity equation.6. Mild aortic regurgitation.7. No pericardial effusion seen.8. Estimated pulmonary artery systolic pressure is 24 mmHg.9. No prior echocardiogram is available for comparison.    Carotid US 3/7/24  CONCLUSIONS:1.Right: proximal ICA 20-49% stenosis.2.Left: proximal ICA 20-49% stenosis.3.Vertebral arteries: antegrade flow bilaterally.4.Subclavian arteries: no significant atherosclerosis bilaterally

## 2025-04-24 NOTE — H&P ADULT - NSICDXPASTMEDICALHX_GEN_ALL_CORE_FT
PAST MEDICAL HISTORY:  AS (aortic stenosis)     Cancer of fallopian tube s/p chemo    History of carotid artery disease     HLD (hyperlipidemia)     HTN (hypertension)

## 2025-04-24 NOTE — PRE-ANESTHESIA EVALUATION ADULT - NSANTHOSAYNRD_GEN_A_CORE
No. [place] screening performed.  STOP BANG Legend: 0-2 = LOW Risk; 3-4 = INTERMEDIATE Risk; 5-8 = HIGH Risk

## 2025-04-24 NOTE — PRE-ANESTHESIA EVALUATION ADULT - LAST ECHOCARDIOGRAM
2/20/25.  report reviewed.  small LV cavity, hyperdynamic LV, EF>75%, LVOT gradient 40mmHg.  NL RV.  severe AS, PG 82mmHg, MG 45mmHg/BERNADETTE 0.81cm2, mild AI.  mild MR.  trace TR

## 2025-04-24 NOTE — H&P ADULT - HISTORY OF PRESENT ILLNESS
79F with PMH of Fallopian tube/endometrial/endocervical CA s/p DHIRAJ-BSO (2013) followed by chemotherapy/XRT(at [facility]), HTN, HLD, carotid artery disease, small LV with intra-cavitary gradient of 40 mmHg, and severe AS (PV 4.52, P/MG 82/45, BERNADETTE 0.81), referred for consultation of treatment options for severe AS. She denies prior CVA, MI/CAD, bleeding or clotting disorder.    Patient had seen both her gyn oncologist, Dr. Sandoval, who stated that the patient has a life expectancy of over one year; and her pulmonologist who deemed that her pulmonary nodules fpund on her TAVR CTs are all stable.     Note: patient will +Ab screen. Per lab comment: Per Dr. 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 This patient can be transfused with least incompatible blood after exclusion of underlying alloantibodies. Blood bank will need samples at most 3 days prior to planned procedure. They will need 2hrs to find compatible blood if sample is sent the same day.     Patient seen in same day holding area; Reports no changes to PMHx or medications since last seen by our team. Denies acute or current SOB, chest pain, palpitation, N/V/D, fever/chills, recent illness, or any other concerning symptoms. Pt reports nothing to eat or drink since last night. Pt took aspirin 81mg this morning.

## 2025-04-24 NOTE — PROGRESS NOTE ADULT - SUBJECTIVE AND OBJECTIVE BOX
CTICU  CRITICAL  CARE  attending     Hand off received 					   Pertinent clinical, laboratory, radiographic, hemodynamic, echocardiographic, respiratory data, microbiologic data and chart were reviewed and analyzed frequently throughout the course of the day and night        79 year old female with HTN, HLD, carotid artery disease.  She has history of Fallopian tube/endometrial/endocervical CA s/p DHIRAJ-BSO (2013) followed by chemotherapy and XRT (at [facility]).  She was evaluated for GALDAMEZ.  ECHO: small LV with intra-cavitary gradient of 40 mmHg, and severe AS (PV 4.52, P/MG 82/45, BERNADETTE 0.81).  She was referred to Dr Gracia for consultation of treatment options for severe AS.     S/P TAVR          FAMILY HISTORY:  PAST MEDICAL & SURGICAL HISTORY:  HTN (hypertension)  HLD (hyperlipidemia)  History of carotid artery disease  Cancer of fallopian tube s/p chemo  AS (aortic stenosis)  History of total abdominal hysterectomy and bilateral salpingo-oophorectomy  2013        14 system review was unremarkable    Vital signs, hemodynamic and respiratory parameters were reviewed from the bedside nursing flow sheet.  ICU Vital Signs Last 24 Hrs  T(C): 36.7 (25 Apr 2025 05:40), Max: 36.7 (25 Apr 2025 05:40)  T(F): 98.1 (25 Apr 2025 05:40), Max: 98.1 (25 Apr 2025 05:40)  HR: 66 (25 Apr 2025 05:00) (62 - 91)  BP: 142/61 (25 Apr 2025 01:00) (82/55 - 142/61)  BP(mean): 88 (25 Apr 2025 01:00) (60 - 88)  ABP: 121/39 (25 Apr 2025 05:00) (77/32 - 163/50)  ABP(mean): 66 (25 Apr 2025 05:00) (44 - 90)  RR: 15 (25 Apr 2025 05:00) (14 - 18)  SpO2: 96% (25 Apr 2025 05:00) (94% - 100%)    O2 Parameters below as of 25 Apr 2025 05:00  Patient On (Oxygen Delivery Method): room air          Adult Advanced Hemodynamics Last 24 Hrs  CVP(mm Hg): --  CVP(cm H2O): --  CO: --  CI: --  PA: --  PA(mean): --  PCWP: --  SVR: --  SVRI: --  PVR: --  PVRI: --, ABG - ( 25 Apr 2025 04:01 )  pH, Arterial: 7.43  pH, Blood: x     /  pCO2: 37    /  pO2: 98    / HCO3: 25    / Base Excess: 0.5   /  SaO2: 99.1                Intake and output was reviewed and the fluid balance was calculated  Daily Height in cm: 165.1 (24 Apr 2025 14:23)    Daily   I&O's Summary    24 Apr 2025 07:01  -  25 Apr 2025 06:19  --------------------------------------------------------  IN: 550 mL / OUT: 700 mL / NET: -150 mL        All lines and drain sites were assessed    Neuro: No change in the mental status from the baseline.   Neck: No JVD.  CVS: S1, S2, No S3.  Lungs: Good air entry bilaterally.  Abd: Soft. No tenderness. + Bowel sounds.  Vascular: + DP/PT.  Extremities: No edema.  Lymphatic: Normal.  Skin: No abnormalities.      labs  CBC Full  -  ( 25 Apr 2025 04:02 )  WBC Count : 8.21 K/uL  RBC Count : 3.38 M/uL  Hemoglobin : 11.1 g/dL  Hematocrit : 30.7 %  Platelet Count - Automated : 141 K/uL  Mean Cell Volume : 90.8 fl  Mean Cell Hemoglobin : 32.8 pg  Mean Cell Hemoglobin Concentration : 36.2 g/dL  Auto Neutrophil # : x  Auto Lymphocyte # : x  Auto Monocyte # : x  Auto Eosinophil # : x  Auto Basophil # : x  Auto Neutrophil % : x  Auto Lymphocyte % : x  Auto Monocyte % : x  Auto Eosinophil % : x  Auto Basophil % : x    04-25    141  |  104  |  19  ----------------------------<  132[H]  4.3   |  25  |  0.80    Ca    9.0      25 Apr 2025 04:02  Phos  4.1     04-25  Mg     2.0     04-25    TPro  6.2  /  Alb  4.2  /  TBili  0.5  /  DBili  x   /  AST  25  /  ALT  12  /  AlkPhos  90  04-25    PT/INR - ( 25 Apr 2025 04:02 )   PT: 12.1 sec;   INR: 1.03          PTT - ( 25 Apr 2025 04:02 )  PTT:30.3 sec  The current medications were reviewed   MEDICATIONS  (STANDING):  aspirin enteric coated 81 milliGRAM(s) Oral daily  ceFAZolin   IVPB 2000 milliGRAM(s) IV Intermittent every 8 hours  chlorhexidine 2% Cloths 1 Application(s) Topical daily  heparin   Injectable 5000 Unit(s) SubCutaneous every 8 hours  pantoprazole    Tablet 40 milliGRAM(s) Oral before breakfast  polyethylene glycol 3350 17 Gram(s) Oral daily  rosuvastatin 20 milliGRAM(s) Oral at bedtime  senna 2 Tablet(s) Oral at bedtime    MEDICATIONS  (PRN):  acetaminophen     Tablet .. 650 milliGRAM(s) Oral every 6 hours PRN Temp greater or equal to 38C (100.4F), Mild Pain (1 - 3)        79 year old  Female admitted with AS.  S/P TAVR   Hemodynamically stable.  Good oxygenation.  Fair urine out put.        My plan includes :  Statin Rx  Close hemodynamic monitoring   Monitor for arrhythmias and monitor parameters for organ perfusion  Monitor neurologic status  Monitor renal function.  Head of the bed should remain elevated to 45 deg .   Chest PT and IS will be encouraged  Monitor adequacy of oxygenation   Nutritional goals will be met using po eventually , ensure adequate caloric intake and monitor the same  Stress ulcer and VTE prophylaxis will be achieved    Glycemic control is satisfactory  Electrolytes have been repleted as necessary and wound care has been carried out. Pain control has been achieved.   Aggressive physical therapy and early mobility and ambulation goals will be met   The family was updated about the course and plan  CRITICAL CARE TIME SPENT in evaluation and management, reassessments, review and interpretation of labs and x-rays,  hemodynamic management, formulating a plan and coordinating care: ___60____ MIN.  Time does not include procedural time.  CTICU ATTENDING     					    Brando Varghese MD

## 2025-04-24 NOTE — PRE-ANESTHESIA EVALUATION ADULT - NSANTHTIREDRD_ENT_A_CORE
Chief Complaint   Patient presents with   • Dizziness       Melissa Ashley presents for follow up of dizziness/vertigo.   Since last being seen she has been doing worse.  Patient was originally evaluated back in early November.  She presented to the emergency room on 11/12/17 after 2 days of symptoms.  She does have a history of mild vertigo symptoms in the past but usually they are related to position changes and resolve.  She had been having symptoms of dizziness with the unstable feeling with walking.  They were better with sitting or laying.  Diagnosis in the emergency room was vertigo and she was referred to physical therapy.  She returned to the ER on 11/14/17 after having blurry vision and difficulty focusing with headaches associated with the dizziness.  CT of her head was performed without any evidence of acute hemorrhage or masses.  Patient has been using meclizine about 3-4 times daily which helps with some of the symptoms.  She has a pressure behind her left ear which has been present since an insect bite earlier last summer.  She has been using ibuprofen 200mg 2 in the morning 2 in the afternoon for headache relief.  She notes the dizziness is not associated with all position changes.  Seems to be worse with looking up or down.  No symptoms when rolling over in bed or looking from side to side.  Eye movements do not trigger symptoms.  Bright lights and glare off of snow when outside seems to make her feel off balance as well.  Past history significant for migraines and B-12 deficiency.  Work seems aggravated she is constantly looking down.  Physical therapy has not helped symptoms at this time.  Continues to have pain behind her left ear and into her left neck.  Denies any numbness tingling or weakness. Vision has been blurry recently with difficulty focusing.    B-12 levels and Lyme titer were normal.  Past medical history significant for surgery for removal of a meningioma.  No follow-up MRI since  surgery.  Continues to have symptoms with trigeminal neuralgia at times.  Pain is separate from current symptoms.      ALLERGIES:   Allergen Reactions   • Bactrim HIVES   • Ceftin HIVES   • Concerta [Methylphenidate Hcl]      Dizzy spells    • Penicillins HIVES     Breathing problems    • Topamax      Ulcer mouth    • Wellbutrin Sr [Budeprion Sr] HIVES and RASH       Current Outpatient Prescriptions   Medication Sig Dispense Refill   • methylPREDNISolone (MEDROL DOSEPAK) 4 MG tablet follow package directions 21 tablet 0   • meclizine HCl (ANTIVERT) 25 MG tablet Take 1-2 tablets by mouth 3 times daily as needed for Dizziness. 90 tablet 1   • Olopatadine HCl (PATANASE NA)      • propranolol (INDERAL) 40 MG tablet TAKE 1 TABLET BY MOUTH TWICE DAILY 180 tablet 1   • lamoTRIgine (LAMICTAL XR) 25 MG extended-release tablet Take 1 tablet by mouth daily. 90 tablet 2   • fluticasone (FLONASE) 50 MCG/ACT nasal spray Spray 1 spray in each nostril daily. 16 g 5   • IBUPROFEN PO Take  by mouth.     • loratadine (CLARITIN) 10 MG tablet Take 10 mg by mouth daily.       No current facility-administered medications for this visit.        Physical Exam:  Visit Vitals  /80   Pulse 60   Ht 5' 6\" (1.676 m)   Wt 82.6 kg   LMP 02/13/2017   BMI 29.38 kg/m²     Gen: 56 year old female pleasant, alert and orientated, no acute distress.  HEENT: Conjunctivae are noninjected.  Pupils equal round reactive to light and accommodation extraocular movements are intact.  No nystagmus elicited.  TMs are dull with some slight fluid but no erythema.  External canals are clear.  Mild tenderness palpation behind the left ear.  Dizziness elicited with flexion and extension at the neck.  No dizziness problems with lateral bending or rotation.   Lungs: Clear to auscultation.  Cardiac: Regular rate and rhythm  Extremities: 5 out of 5 strength +2 symmetric reflexes.  Clinicians intact.    In office testing: B-12 level to be rechecked with B-12 IM  injection given 1000MCG.    Assessment:   1. Dizziness    2. Neck pain on left side    3. Other vitamin B12 deficiency anemia          Plan: Refills provided on meclizine 1-2 tablets up to 3 times daily as needed for the dizziness.  Because symptoms seem to be related to movement of the neck and she has left-sided neck tenderness, we will order MRI of her neck to evaluate for impingement or abnormalities that may be contribute to the dizziness.  Patient has a past history of brain mass and although benign has not had follow-up MRI for an MRI of the brain with and without contrast is indicated to rule out structural abnormalities not found on the CT scan.  If at any point the dizziness is worsening consider treatment with a Medrol Dosepak to calm cervical and muscle inflammation.   Follow up pending results of MRI or sooner if symptoms are worsening. If anything worsens or changes she should call or follow up sooner than planned.   All questions and concerns addressed.        Preventive care: Deferred due to patient's current illness    Supervising physician: Dr. MARYSOL Dick     No

## 2025-04-24 NOTE — H&P ADULT - NSHPREVIEWOFSYSTEMS_GEN_ALL_CORE
Review of Systems  CONSTITUTIONAL:  Denies Fevers / chills, sweats, fatigue, weight loss, weight gain                                      NEURO:  Denies changes in sensation, seizures, syncope, confusion                                                                            EYES:  Denies Blurry vision, discharge, pain, loss of vision                                                                                    ENMT:  Denies Difficulty hearing, vertigo, dysphagia, epistaxis, recent dental work                                       CV:  Denies Chest pain, palpitations, GALDAMEZ, orthopnea                                                                                          RESPIRATORY:  Denies Wheezing, SOB, cough / sputum, hemoptysis                                                                GI:  Denies Nausea, vomiting, diarrhea, constipation, melena, difficulty swallowing                                               : Denies Hematuria, dysuria, urgency, incontinence                                                                                         MUSKULOSKELETAL:  Denies arthritis, joint swelling, muscle weakness                                                             SKIN/BREAST:  Denies rash, itching, hair loss, masses                                                                                            PSYCH:  Denies depression, anxiety, suicidal ideation                                                                                               HEME/LYMPH:  Denies bruises easily, enlarged lymph nodes, tender lymph nodes                                        ENDOCRINE:  Denies cold intolerance, heat intolerance, polydipsia

## 2025-04-25 ENCOUNTER — TRANSCRIPTION ENCOUNTER (OUTPATIENT)
Age: 80
End: 2025-04-25

## 2025-04-25 ENCOUNTER — RESULT REVIEW (OUTPATIENT)
Age: 80
End: 2025-04-25

## 2025-04-25 VITALS
RESPIRATION RATE: 19 BRPM | DIASTOLIC BLOOD PRESSURE: 55 MMHG | SYSTOLIC BLOOD PRESSURE: 131 MMHG | OXYGEN SATURATION: 100 % | HEART RATE: 75 BPM

## 2025-04-25 PROBLEM — Z86.79 PERSONAL HISTORY OF OTHER DISEASES OF THE CIRCULATORY SYSTEM: Chronic | Status: ACTIVE | Noted: 2025-04-23

## 2025-04-25 PROBLEM — C57.00 MALIGNANT NEOPLASM OF UNSPECIFIED FALLOPIAN TUBE: Chronic | Status: ACTIVE | Noted: 2025-04-23

## 2025-04-25 LAB
ALBUMIN SERPL ELPH-MCNC: 4.2 G/DL — SIGNIFICANT CHANGE UP (ref 3.3–5)
ALP SERPL-CCNC: 90 U/L — SIGNIFICANT CHANGE UP (ref 40–120)
ALT FLD-CCNC: 12 U/L — SIGNIFICANT CHANGE UP (ref 10–45)
ANION GAP SERPL CALC-SCNC: 12 MMOL/L — SIGNIFICANT CHANGE UP (ref 5–17)
APTT BLD: 30.3 SEC — SIGNIFICANT CHANGE UP (ref 26.1–36.8)
AST SERPL-CCNC: 25 U/L — SIGNIFICANT CHANGE UP (ref 10–40)
BASE EXCESS BLDA CALC-SCNC: 0.5 MMOL/L — SIGNIFICANT CHANGE UP (ref -2–3)
BASOPHILS # BLD AUTO: 0.02 K/UL — SIGNIFICANT CHANGE UP (ref 0–0.2)
BASOPHILS NFR BLD AUTO: 0.2 % — SIGNIFICANT CHANGE UP (ref 0–2)
BILIRUB SERPL-MCNC: 0.5 MG/DL — SIGNIFICANT CHANGE UP (ref 0.2–1.2)
BUN SERPL-MCNC: 19 MG/DL — SIGNIFICANT CHANGE UP (ref 7–23)
CALCIUM SERPL-MCNC: 9 MG/DL — SIGNIFICANT CHANGE UP (ref 8.4–10.5)
CHLORIDE SERPL-SCNC: 104 MMOL/L — SIGNIFICANT CHANGE UP (ref 96–108)
CO2 BLDA-SCNC: 26 MMOL/L — HIGH (ref 19–24)
CO2 SERPL-SCNC: 25 MMOL/L — SIGNIFICANT CHANGE UP (ref 22–31)
CREAT SERPL-MCNC: 0.8 MG/DL — SIGNIFICANT CHANGE UP (ref 0.5–1.3)
EGFR: 75 ML/MIN/1.73M2 — SIGNIFICANT CHANGE UP
EGFR: 75 ML/MIN/1.73M2 — SIGNIFICANT CHANGE UP
EOSINOPHIL # BLD AUTO: 0 K/UL — SIGNIFICANT CHANGE UP (ref 0–0.5)
EOSINOPHIL NFR BLD AUTO: 0 % — SIGNIFICANT CHANGE UP (ref 0–6)
GAS PNL BLDA: SIGNIFICANT CHANGE UP
GLUCOSE SERPL-MCNC: 132 MG/DL — HIGH (ref 70–99)
HCO3 BLDA-SCNC: 25 MMOL/L — SIGNIFICANT CHANGE UP (ref 21–28)
HCT VFR BLD CALC: 30.7 % — LOW (ref 34.5–45)
HGB BLD-MCNC: 11.1 G/DL — LOW (ref 11.5–15.5)
IMM GRANULOCYTES NFR BLD AUTO: 0.4 % — SIGNIFICANT CHANGE UP (ref 0–0.9)
INR BLD: 1.03 — SIGNIFICANT CHANGE UP (ref 0.85–1.16)
LYMPHOCYTES # BLD AUTO: 0.49 K/UL — LOW (ref 1–3.3)
LYMPHOCYTES # BLD AUTO: 6 % — LOW (ref 13–44)
MAGNESIUM SERPL-MCNC: 2 MG/DL — SIGNIFICANT CHANGE UP (ref 1.6–2.6)
MCHC RBC-ENTMCNC: 32.8 PG — SIGNIFICANT CHANGE UP (ref 27–34)
MCHC RBC-ENTMCNC: 36.2 G/DL — HIGH (ref 32–36)
MCV RBC AUTO: 90.8 FL — SIGNIFICANT CHANGE UP (ref 80–100)
MONOCYTES # BLD AUTO: 0.37 K/UL — SIGNIFICANT CHANGE UP (ref 0–0.9)
MONOCYTES NFR BLD AUTO: 4.5 % — SIGNIFICANT CHANGE UP (ref 2–14)
NEUTROPHILS # BLD AUTO: 7.3 K/UL — SIGNIFICANT CHANGE UP (ref 1.8–7.4)
NEUTROPHILS NFR BLD AUTO: 88.9 % — HIGH (ref 43–77)
NRBC BLD AUTO-RTO: 0 /100 WBCS — SIGNIFICANT CHANGE UP (ref 0–0)
PCO2 BLDA: 37 MMHG — SIGNIFICANT CHANGE UP (ref 32–45)
PH BLDA: 7.43 — SIGNIFICANT CHANGE UP (ref 7.35–7.45)
PHOSPHATE SERPL-MCNC: 4.1 MG/DL — SIGNIFICANT CHANGE UP (ref 2.5–4.5)
PLATELET # BLD AUTO: 141 K/UL — LOW (ref 150–400)
PO2 BLDA: 98 MMHG — SIGNIFICANT CHANGE UP (ref 83–108)
POTASSIUM SERPL-MCNC: 4.3 MMOL/L — SIGNIFICANT CHANGE UP (ref 3.5–5.3)
POTASSIUM SERPL-SCNC: 4.3 MMOL/L — SIGNIFICANT CHANGE UP (ref 3.5–5.3)
PROT SERPL-MCNC: 6.2 G/DL — SIGNIFICANT CHANGE UP (ref 6–8.3)
PROTHROM AB SERPL-ACNC: 12.1 SEC — SIGNIFICANT CHANGE UP (ref 9.9–13.4)
RBC # BLD: 3.38 M/UL — LOW (ref 3.8–5.2)
RBC # FLD: 12.8 % — SIGNIFICANT CHANGE UP (ref 10.3–14.5)
SAO2 % BLDA: 99.1 % — HIGH (ref 94–98)
SODIUM SERPL-SCNC: 141 MMOL/L — SIGNIFICANT CHANGE UP (ref 135–145)
WBC # BLD: 8.21 K/UL — SIGNIFICANT CHANGE UP (ref 3.8–10.5)
WBC # FLD AUTO: 8.21 K/UL — SIGNIFICANT CHANGE UP (ref 3.8–10.5)

## 2025-04-25 PROCEDURE — 80053 COMPREHEN METABOLIC PANEL: CPT

## 2025-04-25 PROCEDURE — 93005 ELECTROCARDIOGRAM TRACING: CPT

## 2025-04-25 PROCEDURE — C1894: CPT

## 2025-04-25 PROCEDURE — 86901 BLOOD TYPING SEROLOGIC RH(D): CPT

## 2025-04-25 PROCEDURE — 85610 PROTHROMBIN TIME: CPT

## 2025-04-25 PROCEDURE — C1884: CPT

## 2025-04-25 PROCEDURE — C1889: CPT

## 2025-04-25 PROCEDURE — C1887: CPT

## 2025-04-25 PROCEDURE — L8699: CPT

## 2025-04-25 PROCEDURE — C1760: CPT

## 2025-04-25 PROCEDURE — 86900 BLOOD TYPING SEROLOGIC ABO: CPT

## 2025-04-25 PROCEDURE — 71045 X-RAY EXAM CHEST 1 VIEW: CPT | Mod: 26

## 2025-04-25 PROCEDURE — 85025 COMPLETE CBC W/AUTO DIFF WBC: CPT

## 2025-04-25 PROCEDURE — 85027 COMPLETE CBC AUTOMATED: CPT

## 2025-04-25 PROCEDURE — C1769: CPT

## 2025-04-25 PROCEDURE — 86922 COMPATIBILITY TEST ANTIGLOB: CPT

## 2025-04-25 PROCEDURE — 86850 RBC ANTIBODY SCREEN: CPT

## 2025-04-25 PROCEDURE — 71045 X-RAY EXAM CHEST 1 VIEW: CPT

## 2025-04-25 PROCEDURE — C8929: CPT

## 2025-04-25 PROCEDURE — 93010 ELECTROCARDIOGRAM REPORT: CPT

## 2025-04-25 PROCEDURE — 36415 COLL VENOUS BLD VENIPUNCTURE: CPT

## 2025-04-25 PROCEDURE — 85730 THROMBOPLASTIN TIME PARTIAL: CPT

## 2025-04-25 PROCEDURE — 83735 ASSAY OF MAGNESIUM: CPT

## 2025-04-25 PROCEDURE — P9045: CPT

## 2025-04-25 PROCEDURE — 82803 BLOOD GASES ANY COMBINATION: CPT

## 2025-04-25 PROCEDURE — 93306 TTE W/DOPPLER COMPLETE: CPT | Mod: 26

## 2025-04-25 PROCEDURE — 83880 ASSAY OF NATRIURETIC PEPTIDE: CPT

## 2025-04-25 PROCEDURE — 84100 ASSAY OF PHOSPHORUS: CPT

## 2025-04-25 RX ORDER — LOSARTAN POTASSIUM 100 MG/1
1 TABLET, FILM COATED ORAL
Refills: 0 | DISCHARGE

## 2025-04-25 RX ORDER — SENNA 187 MG
2 TABLET ORAL
Qty: 10 | Refills: 0
Start: 2025-04-25 | End: 2025-04-29

## 2025-04-25 RX ORDER — ACETAMINOPHEN 500 MG/5ML
2 LIQUID (ML) ORAL
Qty: 56 | Refills: 0
Start: 2025-04-25 | End: 2025-05-01

## 2025-04-25 RX ORDER — ASPIRIN 325 MG
0 TABLET ORAL
Refills: 0 | DISCHARGE

## 2025-04-25 RX ORDER — ROSUVASTATIN CALCIUM 20 MG/1
1 TABLET, FILM COATED ORAL
Qty: 30 | Refills: 0
Start: 2025-04-25 | End: 2025-05-24

## 2025-04-25 RX ORDER — ASPIRIN 325 MG
1 TABLET ORAL
Qty: 30 | Refills: 0
Start: 2025-04-25 | End: 2025-05-24

## 2025-04-25 RX ORDER — ROSUVASTATIN CALCIUM 20 MG/1
1 TABLET, FILM COATED ORAL
Refills: 0 | DISCHARGE

## 2025-04-25 RX ORDER — LOSARTAN POTASSIUM 100 MG/1
1 TABLET, FILM COATED ORAL
Qty: 30 | Refills: 0
Start: 2025-04-25 | End: 2025-05-24

## 2025-04-25 RX ORDER — POLYETHYLENE GLYCOL 3350 17 G/17G
17 POWDER, FOR SOLUTION ORAL
Qty: 85 | Refills: 0
Start: 2025-04-25 | End: 2025-04-29

## 2025-04-25 RX ORDER — LOSARTAN POTASSIUM 100 MG/1
25 TABLET, FILM COATED ORAL DAILY
Refills: 0 | Status: DISCONTINUED | OUTPATIENT
Start: 2025-04-25 | End: 2025-04-25

## 2025-04-25 RX ADMIN — LOSARTAN POTASSIUM 25 MILLIGRAM(S): 100 TABLET, FILM COATED ORAL at 08:32

## 2025-04-25 RX ADMIN — HEPARIN SODIUM 5000 UNIT(S): 1000 INJECTION INTRAVENOUS; SUBCUTANEOUS at 06:30

## 2025-04-25 RX ADMIN — Medication 81 MILLIGRAM(S): at 15:04

## 2025-04-25 RX ADMIN — Medication 10 MILLIGRAM(S): at 08:32

## 2025-04-25 RX ADMIN — GABAPENTIN 200 MILLIGRAM(S): 400 CAPSULE ORAL at 00:10

## 2025-04-25 RX ADMIN — Medication 1 APPLICATION(S): at 13:04

## 2025-04-25 RX ADMIN — Medication 40 MILLIGRAM(S): at 07:29

## 2025-04-25 RX ADMIN — Medication 100 MILLIGRAM(S): at 06:54

## 2025-04-25 RX ADMIN — Medication 650 MILLIGRAM(S): at 01:00

## 2025-04-25 RX ADMIN — Medication 650 MILLIGRAM(S): at 00:10

## 2025-04-25 NOTE — DISCHARGE NOTE NURSING/CASE MANAGEMENT/SOCIAL WORK - NSDCPEFALRISK_GEN_ALL_CORE
For information on Fall & Injury Prevention, visit: [email or web address] OR  [email or web address] OR  https://www.cdc.gov/steadi/patient.html

## 2025-04-25 NOTE — DISCHARGE NOTE PROVIDER - NSDCFUSCHEDAPPT_GEN_ALL_CORE_FT
Poorinma Bravo  [facility] Physician [facility]  CTSURG [address]  Scheduled Appointment: 05/09/2025    Amanuel Mosquera  [facility] Physician [facility]  CARDIOLOGY [address]  Scheduled Appointment: 05/22/2025

## 2025-04-25 NOTE — DISCHARGE NOTE PROVIDER - NSDCCPTREATMENT_GEN_ALL_CORE_FT
PRINCIPAL PROCEDURE  Procedure: TAVR, percutaneous  Findings and Treatment: TF TAVR with 23mm Jakub

## 2025-04-25 NOTE — DISCHARGE NOTE PROVIDER - CARE PROVIDERS DIRECT ADDRESSES
[email or web address]

## 2025-04-25 NOTE — DISCHARGE NOTE PROVIDER - NSDCMRMEDTOKEN_GEN_ALL_CORE_FT
acetaminophen 325 mg oral tablet: 2 tab(s) orally every 6 hours as needed for Temp greater or equal to 38C (100.4F), Mild Pain (1 - 3)  ascorbic acid: once a day  aspirin 81 mg oral delayed release tablet: 1 tab(s) orally once a day  Biotin: once a day  gabapentin 100 mg oral capsule: 2 cap(s) orally 3 times a day  Ginger: once a day  ipratropium 21 mcg/inh (0.03%) nasal spray: 2 spray(s) intranasally once a day  losartan 25 mg oral tablet: 1 tab(s) orally once a day  multivitamin: once a day  polyethylene glycol 3350 oral powder for reconstitution: 17 gram(s) orally once a day as needed for  constipation  rosuvastatin 20 mg oral tablet: 1 tab(s) orally once a day (at bedtime)  senna leaf extract oral tablet: 2 tab(s) orally once a day (at bedtime)  Turmeric: once a day  Vitamin B12 1000 mcg oral tablet: 1 tab(s) orally once a day

## 2025-04-25 NOTE — DISCHARGE NOTE PROVIDER - NSDCFUADDINST_GEN_ALL_CORE_FT
-Walk daily as tolerated and use your incentive spirometer 10 times every hour while you are awake.     -Please weigh yourself daily. If you notice over a 3 pound weight gain in 3 days, this is a sign you are likely retaining too much fluid. It is imperative you call our right away with unexplained rapid weight gain.      -Please continue to wear the compression stockings given to you in the hospital at home. This is a way to prevent fluid from building up in your legs.     -No driving or strenuous activity/exercise until cleared by your surgeon.    -Gently clean your incisions with unscented/antibacterial soap and water, pat dry.  You may leave them open to air.    -Call your doctor if you have shortness of breath, chest pain not relieved by pain medication, dizziness, fever >100.5, or increased redness or drainage from incisions.  You had a MCOT monitor (an external cardiac rhythm monitoring device) placed on your day of discharge.  This helps us monitor your heart while you are out of the hospital for 30 days after discharge. Should your heart go into an abnormal or dangerous rhythm you will receieve a call from the MCOT team and your Structural Heart team of Doctors and PA's will be notified.    1. Keep the monitor within 30 feet of you at all times.  2. When you feel any symptom (chest pain, dizziness, palpitations, weakness, fatigue or anything outside of your normal), press the “Record Symptoms” button on the main phone of your phone  3. Shower or exercise as normal whilewearing the MCOT Patch. Do not swim or take a bath. Patch is water-resistant, not waterproof  4. When the battery is low on the phone or on the device, use the supplied . The monitor will show a warning message when the battery is low.  5. Do not remove the patch from yourskin after you begin monitoring. With normal wear, each patch should last 5 days. To replace the patch follow instructions in the MCOT box with the Patch Guide  6. Any issues with the MCOT device or phone please call Customer Service at [phone].  7. If you have any other questions at all please call the Structural Heart office at [phone]

## 2025-04-25 NOTE — DISCHARGE NOTE NURSING/CASE MANAGEMENT/SOCIAL WORK - FINANCIAL ASSISTANCE
[facility] provides services at a reduced cost to those who are determined to be eligible through [facility]’s financial assistance program. Information regarding [facility]’s financial assistance program can be found by going to [email or web address] or by calling [phone].

## 2025-04-25 NOTE — DISCHARGE NOTE NURSING/CASE MANAGEMENT/SOCIAL WORK - PATIENT PORTAL LINK FT
You can access the FollowMyHealth Patient Portal offered by [facility] by registering at the following website: [email or web address]. By joining [employer]’s FollowMyHealth portal, you will also be able to view your health information using other applications (apps) compatible with our system.

## 2025-04-25 NOTE — DISCHARGE NOTE PROVIDER - NSDCQMSTROKE_NEU_ALL_CORE
No Ears: no ear pain and no hearing problems. Nose: no nasal congestion and no nasal drainage. Mouth/Throat: no dysphagia, no hoarseness and no throat pain. Neck: no lumps, no pain, no stiffness and no swollen glands

## 2025-04-25 NOTE — DISCHARGE NOTE PROVIDER - CARE PROVIDER_API CALL
Santos Gracia  Interventional Cardiology  [address]  [place] [address]  Phone: [phone]  Fax: [phone]  Follow Up Time: 1 week    Amanuel Mosquera  Cardiovascular Disease  [address] [place] [address], [address]  [place] [address]  Phone: [phone]  Fax: [phone]  Follow Up Time: 2 weeks   Santos Gracia  Interventional Cardiology  [address]  [place] [address]  Phone: [phone]  Fax: [phone]  Scheduled Appointment: 05/09/2025 09:00 AM    Amanuel Mosquera  Cardiovascular Disease  [address] [place] [address], [address]  [place] [address]  Phone: [phone]  Fax: [phone]  Follow Up Time: 2 weeks

## 2025-04-25 NOTE — DISCHARGE NOTE PROVIDER - PROVIDER TOKENS
PROVIDER:[TOKEN:[[id number]],FOLLOWUP:[1 week]],PROVIDER:[TOKEN:[[id number]],FOLLOWUP:[2 weeks]] PROVIDER:[TOKEN:[[id number]],SCHEDULEDAPPT:[05/09/2025],SCHEDULEDAPPTTIME:[09:00 AM]],PROVIDER:[TOKEN:[[id number]],FOLLOWUP:[2 weeks]]

## 2025-04-25 NOTE — DISCHARGE NOTE PROVIDER - HOSPITAL COURSE
Patient discussed on morning rounds with Dr. Gracia  Operation Date: 4/24 Dr. Gracia TF [facility]  Primary Surgeon/Attending MD: Dr. Gracia   Referring Physician: Dr. Amanuel Matos  _ _ _ _ _ _ _ _ _ _ _ _   HOSPITAL COURSE:   79F with PMH of Fallopian tube/endometrial/endocervical CA s/p DHIRAJ-BSO (2013) followed by chemotherapy/XRT(at [facility]), HTN, HLD, carotid artery disease, small LV with intra-cavitary gradient of 40 mmHg, and severe AS (PV 4.52, P/MG 82/45, BERNADETTE 0.81), referred for consultation of treatment options for severe AS. Patient had seen both her gyn oncologist, Dr. Sandoval, who stated that the patient has a life expectancy of over one year; and her pulmonologist who deemed that her pulmonary nodules found on her TAVR CTs are all stable. On 4/24 patient underwent a TAVR via LCFA with a 23mm Jakub valve. Patient had no intraoperative rhythm issues so TVP was removed at the case conclusion. Overnight patient remained in NSR with a QRS of 92. On POD#1 Losartan was resumed for hypertension. Post-op echo demonstrated... Patient is ambulating on RA, passing flatus and pain is well controlled. As per Dr. Gracia patient is ready for discharge home today.     DISCHARGE PHYSICAL EXAM:   GEN: NAD, looks comfortable  Neuro: A&Ox3.  No focal deficits.  Moving all extremities.   CV: S1S2, regular, no murmurs appreciated.  No carotid bruits.  No JVD  Lungs: Clear B/L.  No wheezing, rales or rhonchi  ABD: Soft, non-tender, non-distended.  +Bowel sounds  EXT: Warm and well perfused.  No peripheral edema noted. All Distal pulses palpable bilaterally.   Musculoskeletal: Moving all extremities with normal ROM, no joint swelling  Access: Bilateral groins without ecchymosis. Left groin with steri-strips.   _ _ _ _ _ _ _ _ _ _ _ _   REMOVAL CHECKLIST:         [ n/a ] Epicardial wires         [ n/a ] Stitches/tie downs,   If no, why?          [ n/a ] PICC/Midline,   If no, why?    _ _ _ _ _ _ _ _ _ _ _ _   MEDICATION DISCHARGE CHECKLIST         TAVR         [ x] Aspirin, [  ] Contraindicated, Reason:         [ ] Plavix, [ x] Contraindicated, Reason:     _ _ _ _ _ _ _ _ _ _ _   RELEVANT LABS/IMAGING:     _  _ _ _ _ _ _ _ _ _ _   CLINICAL FOLLOW UP NEEDS:      [ n/a] Lab work needed:      [ n/a ] Imaging needed:      [ n/a ] Home equipment            Type: (i.e. wound vac, pneumostat, prevena, wet/dry dressings, picc/midlines, MCOT, dai etc)   _ _ _ _ _ _ _ _ _ _ _ _   Over 35 minutes was spent with the patient reviewing the discharge material including medications, follow up appointments, recovery, concerning symptoms, and how to contact their health care providers if they have questions.   Patient discussed on morning rounds with Dr. Gracia  Operation Date: 4/24 [facility]. Gr[facility]  Primary Surgeon/Attending MD: Dr. Gracia   Referring Physician: Dr. Amanuel Matos  _ _ _ _ _ _ _ _ _ _ _ _   HOSPITAL COURSE:   79F with PMH of Fallopian tube/endometrial/endocervical CA s/p DHIRAJ-BSO (2013) followed by chemotherapy/XRT(at [facility]), HTN, HLD, carotid artery disease, small LV with intra-cavitary gradient of 40 mmHg, and severe AS (PV 4.52, P/MG 82/45, BERNADETTE 0.81), referred for consultation of treatment options for severe AS. Patient had seen both her gyn oncologist, Dr. Sandoval, who stated that the patient has a life expectancy of over one year; and her pulmonologist who deemed that her pulmonary nodules found on her TAVR CTs are all stable. On 4/24 patient underwent a TAVR via LCFA with a 23mm Jakub valve. Patient had no intraoperative rhythm issues so TVP was removed at the case conclusion. Overnight patient remained in NSR with a QRS of 92. On POD#1 Losartan was resumed for hypertension. Post-op echo demonstrated no pericardial effusion and a well seated Jakub valve with no PVL. Patient is ambulating on RA, passing flatus and pain is well controlled. As per Dr. Gracia patient is ready for discharge home today.     DISCHARGE PHYSICAL EXAM:   GEN: NAD, looks comfortable  Neuro: A&Ox3.  No focal deficits.  Moving all extremities.   CV: S1S2, regular, no murmurs appreciated.  No carotid bruits.  No JVD  Lungs: Clear B/L.  No wheezing, rales or rhonchi  ABD: Soft, non-tender, non-distended.  +Bowel sounds  EXT: Warm and well perfused.  No peripheral edema noted. All Distal pulses palpable bilaterally.   Musculoskeletal: Moving all extremities with normal ROM, no joint swelling  Access: Bilateral groins without ecchymosis. Left groin with steri-strips.   _ _ _ _ _ _ _ _ _ _ _ _   REMOVAL CHECKLIST:         [ n/a ] Epicardial wires         [ n/a ] Stitches/tie downs,   If no, why?          [ n/a ] PICC/Midline,   If no, why?    _ _ _ _ _ _ _ _ _ _ _ _   MEDICATION DISCHARGE CHECKLIST         TAVR         [ x] Aspirin, [  ] Contraindicated, Reason:         [ ] Plavix, [ x] Contraindicated, Reason:     _ _ _ _ _ _ _ _ _ _ _   RELEVANT LABS/IMAGING:     _  _ _ _ _ _ _ _ _ _ _   CLINICAL FOLLOW UP NEEDS:      [ n/a] Lab work needed:      [ n/a ] Imaging needed:      [ n/a ] Home equipment            Type: (i.e. wound vac, pneumostat, prevena, wet/dry dressings, picc/midlines, MCOT, dai etc)   _ _ _ _ _ _ _ _ _ _ _ _   Over 35 minutes was spent with the patient reviewing the discharge material including medications, follow up appointments, recovery, concerning symptoms, and how to contact their health care providers if they have questions.

## 2025-04-26 ENCOUNTER — TRANSCRIPTION ENCOUNTER (OUTPATIENT)
Age: 80
End: 2025-04-26

## 2025-04-26 PROBLEM — E78.5 HYPERLIPIDEMIA, UNSPECIFIED: Chronic | Status: ACTIVE | Noted: 2025-04-23

## 2025-04-26 PROBLEM — I35.0 NONRHEUMATIC AORTIC (VALVE) STENOSIS: Chronic | Status: ACTIVE | Noted: 2025-04-23

## 2025-04-26 PROBLEM — I10 ESSENTIAL (PRIMARY) HYPERTENSION: Chronic | Status: ACTIVE | Noted: 2025-04-23

## 2025-04-28 ENCOUNTER — NON-APPOINTMENT (OUTPATIENT)
Age: 80
End: 2025-04-28

## 2025-04-29 DIAGNOSIS — Z92.21 PERSONAL HISTORY OF ANTINEOPLASTIC CHEMOTHERAPY: ICD-10-CM

## 2025-04-29 DIAGNOSIS — Z85.44 PERSONAL HISTORY OF MALIGNANT NEOPLASM OF OTHER FEMALE GENITAL ORGANS: ICD-10-CM

## 2025-04-29 DIAGNOSIS — Z88.0 ALLERGY STATUS TO PENICILLIN: ICD-10-CM

## 2025-04-29 DIAGNOSIS — Z85.42 PERSONAL HISTORY OF MALIGNANT NEOPLASM OF OTHER PARTS OF UTERUS: ICD-10-CM

## 2025-04-29 DIAGNOSIS — Z85.41 PERSONAL HISTORY OF MALIGNANT NEOPLASM OF CERVIX UTERI: ICD-10-CM

## 2025-04-29 DIAGNOSIS — I35.0 NONRHEUMATIC AORTIC (VALVE) STENOSIS: ICD-10-CM

## 2025-04-29 DIAGNOSIS — Z90.722 ACQUIRED ABSENCE OF OVARIES, BILATERAL: ICD-10-CM

## 2025-04-29 DIAGNOSIS — I10 ESSENTIAL (PRIMARY) HYPERTENSION: ICD-10-CM

## 2025-04-29 DIAGNOSIS — Z88.1 ALLERGY STATUS TO OTHER ANTIBIOTIC AGENTS: ICD-10-CM

## 2025-04-29 DIAGNOSIS — Z90.79 ACQUIRED ABSENCE OF OTHER GENITAL ORGAN(S): ICD-10-CM

## 2025-04-29 DIAGNOSIS — E78.5 HYPERLIPIDEMIA, UNSPECIFIED: ICD-10-CM

## 2025-04-29 DIAGNOSIS — Z90.710 ACQUIRED ABSENCE OF BOTH CERVIX AND UTERUS: ICD-10-CM

## 2025-04-29 DIAGNOSIS — Z92.3 PERSONAL HISTORY OF IRRADIATION: ICD-10-CM

## 2025-04-29 DIAGNOSIS — R91.8 OTHER NONSPECIFIC ABNORMAL FINDING OF LUNG FIELD: ICD-10-CM

## 2025-04-29 DIAGNOSIS — Z00.6 ENCOUNTER FOR EXAMINATION FOR NORMAL COMPARISON AND CONTROL IN CLINICAL RESEARCH PROGRAM: ICD-10-CM

## 2025-04-29 DIAGNOSIS — Z79.82 LONG TERM (CURRENT) USE OF ASPIRIN: ICD-10-CM

## 2025-04-29 DIAGNOSIS — I65.23 OCCLUSION AND STENOSIS OF BILATERAL CAROTID ARTERIES: ICD-10-CM

## 2025-04-30 ENCOUNTER — APPOINTMENT (OUTPATIENT)
Dept: CARE COORDINATION | Facility: HOME HEALTH | Age: 80
End: 2025-04-30

## 2025-04-30 DIAGNOSIS — Z95.2 PRESENCE OF PROSTHETIC HEART VALVE: ICD-10-CM

## 2025-05-01 ENCOUNTER — NON-APPOINTMENT (OUTPATIENT)
Age: 80
End: 2025-05-01

## 2025-05-01 VITALS
OXYGEN SATURATION: 99 % | DIASTOLIC BLOOD PRESSURE: 78 MMHG | SYSTOLIC BLOOD PRESSURE: 151 MMHG | HEART RATE: 80 BPM | RESPIRATION RATE: 16 BRPM

## 2025-05-01 PROBLEM — Z95.2 S/P TAVR (TRANSCATHETER AORTIC VALVE REPLACEMENT): Status: ACTIVE | Noted: 2025-05-01

## 2025-05-09 ENCOUNTER — APPOINTMENT (OUTPATIENT)
Dept: CARDIOTHORACIC SURGERY | Facility: CLINIC | Age: 80
End: 2025-05-09
Payer: COMMERCIAL

## 2025-05-09 PROCEDURE — 99213 OFFICE O/P EST LOW 20 MIN: CPT | Mod: 95

## 2025-05-22 ENCOUNTER — NON-APPOINTMENT (OUTPATIENT)
Age: 80
End: 2025-05-22

## 2025-05-22 ENCOUNTER — APPOINTMENT (OUTPATIENT)
Dept: CARDIOLOGY | Facility: CLINIC | Age: 80
End: 2025-05-22
Payer: COMMERCIAL

## 2025-05-22 VITALS
HEIGHT: 66 IN | WEIGHT: 115 LBS | HEART RATE: 73 BPM | SYSTOLIC BLOOD PRESSURE: 126 MMHG | OXYGEN SATURATION: 100 % | DIASTOLIC BLOOD PRESSURE: 73 MMHG | BODY MASS INDEX: 18.48 KG/M2

## 2025-05-22 DIAGNOSIS — I10 ESSENTIAL (PRIMARY) HYPERTENSION: ICD-10-CM

## 2025-05-22 DIAGNOSIS — C57.00 MALIGNANT NEOPLASM OF UNSPECIFIED FALLOPIAN TUBE: ICD-10-CM

## 2025-05-22 DIAGNOSIS — R06.02 SHORTNESS OF BREATH: ICD-10-CM

## 2025-05-22 DIAGNOSIS — R00.2 PALPITATIONS: ICD-10-CM

## 2025-05-22 DIAGNOSIS — Z95.2 PRESENCE OF PROSTHETIC HEART VALVE: ICD-10-CM

## 2025-05-22 PROCEDURE — 99215 OFFICE O/P EST HI 40 MIN: CPT

## 2025-05-22 PROCEDURE — G2211 COMPLEX E/M VISIT ADD ON: CPT | Mod: NC

## 2025-05-22 PROCEDURE — 93000 ELECTROCARDIOGRAM COMPLETE: CPT

## 2025-05-22 RX ORDER — AZITHROMYCIN 500 MG/1
500 TABLET, FILM COATED ORAL
Qty: 1 | Refills: 3 | Status: ACTIVE | COMMUNITY
Start: 2025-05-22 | End: 1900-01-01

## 2025-05-28 ENCOUNTER — APPOINTMENT (OUTPATIENT)
Dept: CARDIOLOGY | Facility: CLINIC | Age: 80
End: 2025-05-28
Payer: COMMERCIAL

## 2025-05-28 VITALS
HEART RATE: 73 BPM | TEMPERATURE: 97.6 F | HEIGHT: 66 IN | OXYGEN SATURATION: 97 % | BODY MASS INDEX: 19.44 KG/M2 | SYSTOLIC BLOOD PRESSURE: 129 MMHG | DIASTOLIC BLOOD PRESSURE: 70 MMHG | WEIGHT: 121 LBS

## 2025-05-28 DIAGNOSIS — I10 ESSENTIAL (PRIMARY) HYPERTENSION: ICD-10-CM

## 2025-05-28 DIAGNOSIS — I83.90 ASYMPTOMATIC VARICOSE VEINS OF UNSPECIFIED LOWER EXTREMITY: ICD-10-CM

## 2025-05-28 DIAGNOSIS — I87.2 VENOUS INSUFFICIENCY (CHRONIC) (PERIPHERAL): ICD-10-CM

## 2025-05-28 PROCEDURE — G2211 COMPLEX E/M VISIT ADD ON: CPT | Mod: NC

## 2025-05-28 PROCEDURE — 99215 OFFICE O/P EST HI 40 MIN: CPT

## 2025-05-29 ENCOUNTER — TRANSCRIPTION ENCOUNTER (OUTPATIENT)
Age: 80
End: 2025-05-29

## 2025-05-30 ENCOUNTER — OUTPATIENT (OUTPATIENT)
Dept: OUTPATIENT SERVICES | Facility: HOSPITAL | Age: 80
LOS: 1 days | End: 2025-05-30
Payer: COMMERCIAL

## 2025-05-30 ENCOUNTER — RESULT REVIEW (OUTPATIENT)
Age: 80
End: 2025-05-30

## 2025-05-30 DIAGNOSIS — Z90.710 ACQUIRED ABSENCE OF BOTH CERVIX AND UTERUS: Chronic | ICD-10-CM

## 2025-05-30 PROCEDURE — 93306 TTE W/DOPPLER COMPLETE: CPT

## 2025-05-30 PROCEDURE — 93306 TTE W/DOPPLER COMPLETE: CPT | Mod: 26

## 2025-06-02 ENCOUNTER — APPOINTMENT (OUTPATIENT)
Dept: CARDIOTHORACIC SURGERY | Facility: CLINIC | Age: 80
End: 2025-06-02
Payer: COMMERCIAL

## 2025-06-02 VITALS
SYSTOLIC BLOOD PRESSURE: 112 MMHG | HEIGHT: 65 IN | WEIGHT: 118 LBS | OXYGEN SATURATION: 96 % | TEMPERATURE: 97 F | DIASTOLIC BLOOD PRESSURE: 56 MMHG | BODY MASS INDEX: 19.66 KG/M2 | HEART RATE: 87 BPM

## 2025-06-02 PROBLEM — I83.90 VARICOSE VEINS: Status: ACTIVE | Noted: 2025-06-02

## 2025-06-02 PROCEDURE — 99214 OFFICE O/P EST MOD 30 MIN: CPT

## 2025-06-09 ENCOUNTER — APPOINTMENT (OUTPATIENT)
Dept: CARDIOTHORACIC SURGERY | Facility: CLINIC | Age: 80
End: 2025-06-09

## 2025-07-01 ENCOUNTER — RX RENEWAL (OUTPATIENT)
Age: 80
End: 2025-07-01

## 2025-09-02 ENCOUNTER — NON-APPOINTMENT (OUTPATIENT)
Age: 80
End: 2025-09-02

## (undated) DEVICE — Device

## (undated) DEVICE — SUT PROLENE 4-0 36" RB-1

## (undated) DEVICE — DRAPE SMALL W ADHESIVE APERTURE

## (undated) DEVICE — DRSG QUICKCLOT HEMOSTATIC 4X4 FOIL

## (undated) DEVICE — SUT STAINLESS STEEL 7 4-18" CCS

## (undated) DEVICE — DRSG TRACH DRAINAGE 4X4

## (undated) DEVICE — CATH CV TRAY INSR ST UNIV

## (undated) DEVICE — RIGID ADULT SUCKER

## (undated) DEVICE — CATH NG SALEM SUMP 16FR

## (undated) DEVICE — SYS DEL CONTROLLER ANGIOTOUCH

## (undated) DEVICE — SUT NUROLON 1 18" OS-8 (POP-OFF)

## (undated) DEVICE — SUT PROLENE 6-0 30" RB-2

## (undated) DEVICE — SUT ETHIBOND 3-0 36" RB-1

## (undated) DEVICE — SUT VICRYL 1 36" CTX UNDYED

## (undated) DEVICE — DRSG ALLEVYN LIFE 6 X 6 (PINK)

## (undated) DEVICE — SUT SILK 2-0 18" SH (POP-OFF)

## (undated) DEVICE — SUT PLEDGET 9MM X 4MM X 1.5MM

## (undated) DEVICE — PACING CABLE (BROWN) A/V TEMP SCREW DOWN 12FT

## (undated) DEVICE — PACK HYBRID LHH

## (undated) DEVICE — SUT PLEDGET SOFT MEDIUM 1/4" X 1/8" X 1/16" X6

## (undated) DEVICE — TUBING SUCTION NONCONDUCTIVE 6MM X 12FT

## (undated) DEVICE — MANIFOLD ANGIO AUTOMD TRNDCR

## (undated) DEVICE — PACK PROC CV DRAPE

## (undated) DEVICE — DRSG MEPILEX 10 X 25CM (4 X 10") AG

## (undated) DEVICE — CHEST DRAIN PLEUR-EVAC DRY/WET ADULT-PEDS SINGLE (QUICK)

## (undated) DEVICE — ELCTR ZOLL DEFIBRILLATOR PAD NO REPLACEMENT

## (undated) DEVICE — FOLEY TRAY 16FR 5CC LF LUBRISIL ADVANCE TEMP CLOSED

## (undated) DEVICE — DRAPE OR CAMERA COVER

## (undated) DEVICE — WARMING BLANKET FULL UNDERBODY

## (undated) DEVICE — SUT VICRYL 4-0 18" PS-2 UNDYED

## (undated) DEVICE — TUBING EXTENSION HI PRESSURE FLEX 48"

## (undated) DEVICE — SUT HOLDER INSERT FOR OCTOBASE STERNAL RETRACTOR

## (undated) DEVICE — DRAPE SLUSH / WARMER 44 X 66"

## (undated) DEVICE — TOURNIQUET SET 12FR (1 RED, 1 BLUE, 3 CLEAR, 1 SNARE) 7"

## (undated) DEVICE — PACK OPEN HEART LNX

## (undated) DEVICE — SUT SILK 5-0 60" TIES

## (undated) DEVICE — DRSG BIOPATCH DISK W CHG 1" W 4.0MM HOLE

## (undated) DEVICE — BAND RADIAL COMPRESSION DEVICE REG 24CM

## (undated) DEVICE — DRAPE IOBAN 33" X 23"

## (undated) DEVICE — SUMP INTRACARDIAC/PERICARDIAL 20FR 1/4" ADULT

## (undated) DEVICE — PREP SCRUB BRUSH W CHG 4%

## (undated) DEVICE — DRAPE PROBE COVER 5" X 96"

## (undated) DEVICE — NDL PERCU ECHOTIP 21G X 4CM

## (undated) DEVICE — ULTRASOUND COVER PROBE 14 X 122CM

## (undated) DEVICE — SUT TICRON 2-0 36" CV-316 DA

## (undated) DEVICE — SUT DOUBLE 6 WIRE STERNAL

## (undated) DEVICE — MARKING PEN W RULER

## (undated) DEVICE — HEMOSTASIS VALVE PHD SM BORE W/ SPRING METAL INSERTION TOOL AND TORQUE DEVICE

## (undated) DEVICE — SUT VICRYL 2-0 27" CT-1

## (undated) DEVICE — SUT PROLENE 3-0 36" SH-1

## (undated) DEVICE — CATH CARDIAC RT CUSET 601201319

## (undated) DEVICE — DRAPE SURGICAL #1010

## (undated) DEVICE — POSITIONER FOAM EGG CRATE ULNAR 2PCS (PINK)

## (undated) DEVICE — SUT VICRYL 0 27" CT

## (undated) DEVICE — SYR MED A2000 SYRINGE KIT ACIST REUS

## (undated) DEVICE — TUBING IV EXTENSION 30"

## (undated) DEVICE — SUT STAINLESS STEEL 6 4-18" CCS